# Patient Record
Sex: FEMALE | Race: WHITE | NOT HISPANIC OR LATINO
[De-identification: names, ages, dates, MRNs, and addresses within clinical notes are randomized per-mention and may not be internally consistent; named-entity substitution may affect disease eponyms.]

---

## 2017-03-04 ENCOUNTER — TRANSCRIPTION ENCOUNTER (OUTPATIENT)
Age: 57
End: 2017-03-04

## 2017-11-24 ENCOUNTER — TRANSCRIPTION ENCOUNTER (OUTPATIENT)
Age: 57
End: 2017-11-24

## 2018-04-18 ENCOUNTER — APPOINTMENT (OUTPATIENT)
Dept: INTERNAL MEDICINE | Facility: CLINIC | Age: 58
End: 2018-04-18

## 2018-06-14 ENCOUNTER — APPOINTMENT (OUTPATIENT)
Dept: PULMONOLOGY | Facility: CLINIC | Age: 58
End: 2018-06-14
Payer: MEDICAID

## 2018-06-14 ENCOUNTER — APPOINTMENT (OUTPATIENT)
Dept: PULMONOLOGY | Facility: CLINIC | Age: 58
End: 2018-06-14
Payer: COMMERCIAL

## 2018-06-14 VITALS
HEART RATE: 96 BPM | RESPIRATION RATE: 16 BRPM | OXYGEN SATURATION: 98 % | DIASTOLIC BLOOD PRESSURE: 70 MMHG | WEIGHT: 180 LBS | BODY MASS INDEX: 33.99 KG/M2 | HEIGHT: 61 IN | SYSTOLIC BLOOD PRESSURE: 122 MMHG

## 2018-06-14 DIAGNOSIS — Z87.09 PERSONAL HISTORY OF OTHER DISEASES OF THE RESPIRATORY SYSTEM: ICD-10-CM

## 2018-06-14 DIAGNOSIS — Z86.39 PERSONAL HISTORY OF OTHER ENDOCRINE, NUTRITIONAL AND METABOLIC DISEASE: ICD-10-CM

## 2018-06-14 DIAGNOSIS — J45.909 UNSPECIFIED ASTHMA, UNCOMPLICATED: ICD-10-CM

## 2018-06-14 DIAGNOSIS — Z86.79 PERSONAL HISTORY OF OTHER DISEASES OF THE CIRCULATORY SYSTEM: ICD-10-CM

## 2018-06-14 PROCEDURE — 96372 THER/PROPH/DIAG INJ SC/IM: CPT

## 2018-06-14 PROCEDURE — 99204 OFFICE O/P NEW MOD 45 MIN: CPT | Mod: 25

## 2018-06-14 PROCEDURE — ZZZZZ: CPT

## 2018-06-14 PROCEDURE — 94010 BREATHING CAPACITY TEST: CPT

## 2018-06-14 PROCEDURE — 94640 AIRWAY INHALATION TREATMENT: CPT | Mod: 58

## 2018-06-14 RX ORDER — SIMVASTATIN 40 MG/1
40 TABLET, FILM COATED ORAL
Qty: 30 | Refills: 0 | Status: ACTIVE | COMMUNITY
Start: 2018-01-01

## 2018-06-14 RX ORDER — METHYLPRED ACET/NACL,ISO-OS/PF 40 MG/ML
40 VIAL (ML) INJECTION
Qty: 100 | Refills: 0 | Status: COMPLETED | OUTPATIENT
Start: 2018-06-14

## 2018-06-14 RX ORDER — SYRINGE AND NEEDLE,INSULIN,1ML 31 GX5/16"
31G X 5/16" SYRINGE, EMPTY DISPOSABLE MISCELLANEOUS
Qty: 100 | Refills: 0 | Status: ACTIVE | COMMUNITY
Start: 2018-01-22

## 2018-06-14 RX ORDER — LOSARTAN POTASSIUM 100 MG/1
100 TABLET, FILM COATED ORAL
Qty: 30 | Refills: 0 | Status: ACTIVE | COMMUNITY
Start: 2018-02-22

## 2018-06-14 RX ORDER — BLOOD SUGAR DIAGNOSTIC
STRIP MISCELLANEOUS
Qty: 100 | Refills: 0 | Status: ACTIVE | COMMUNITY
Start: 2017-08-25

## 2018-06-14 RX ADMIN — METHYLPREDNISOLONE ACETATE 1 MG/ML: 40 INJECTION, SUSPENSION INTRA-ARTICULAR; INTRALESIONAL; INTRAMUSCULAR; SOFT TISSUE at 00:00

## 2018-06-22 ENCOUNTER — MEDICATION RENEWAL (OUTPATIENT)
Age: 58
End: 2018-06-22

## 2018-06-22 ENCOUNTER — APPOINTMENT (OUTPATIENT)
Dept: PULMONOLOGY | Facility: CLINIC | Age: 58
End: 2018-06-22
Payer: COMMERCIAL

## 2018-06-22 VITALS
HEART RATE: 88 BPM | BODY MASS INDEX: 35.3 KG/M2 | HEIGHT: 61 IN | SYSTOLIC BLOOD PRESSURE: 122 MMHG | OXYGEN SATURATION: 97 % | RESPIRATION RATE: 17 BRPM | WEIGHT: 187 LBS | DIASTOLIC BLOOD PRESSURE: 86 MMHG

## 2018-06-22 DIAGNOSIS — Z87.891 PERSONAL HISTORY OF NICOTINE DEPENDENCE: ICD-10-CM

## 2018-06-22 PROCEDURE — 99214 OFFICE O/P EST MOD 30 MIN: CPT

## 2018-06-23 PROBLEM — Z87.891 FORMER SMOKER: Status: ACTIVE | Noted: 2018-06-14

## 2018-06-27 ENCOUNTER — EMERGENCY (EMERGENCY)
Facility: HOSPITAL | Age: 58
LOS: 1 days | Discharge: HOME | End: 2018-06-27
Attending: EMERGENCY MEDICINE | Admitting: EMERGENCY MEDICINE
Payer: SELF-PAY

## 2018-06-27 VITALS
HEIGHT: 61 IN | HEART RATE: 66 BPM | WEIGHT: 182.98 LBS | OXYGEN SATURATION: 99 % | RESPIRATION RATE: 17 BRPM | TEMPERATURE: 98 F | SYSTOLIC BLOOD PRESSURE: 151 MMHG | DIASTOLIC BLOOD PRESSURE: 83 MMHG

## 2018-06-27 DIAGNOSIS — S19.9XXA UNSPECIFIED INJURY OF NECK, INITIAL ENCOUNTER: ICD-10-CM

## 2018-06-27 PROCEDURE — 72040 X-RAY EXAM NECK SPINE 2-3 VW: CPT | Mod: 26

## 2018-06-27 PROCEDURE — 72040 X-RAY EXAM NECK SPINE 2-3 VW: CPT

## 2018-06-27 PROCEDURE — 99283 EMERGENCY DEPT VISIT LOW MDM: CPT

## 2018-06-27 PROCEDURE — 99283 EMERGENCY DEPT VISIT LOW MDM: CPT | Mod: 25

## 2018-06-27 PROCEDURE — 99053 MED SERV 10PM-8AM 24 HR FAC: CPT

## 2018-06-27 RX ORDER — IBUPROFEN 200 MG
1 TABLET ORAL
Qty: 15 | Refills: 0
Start: 2018-06-27 | End: 2018-07-01

## 2018-06-27 RX ORDER — IBUPROFEN 200 MG
400 TABLET ORAL ONCE
Qty: 0 | Refills: 0 | Status: COMPLETED | OUTPATIENT
Start: 2018-06-27 | End: 2018-06-27

## 2018-06-27 RX ADMIN — Medication 400 MILLIGRAM(S): at 23:38

## 2018-06-27 RX ADMIN — Medication 400 MILLIGRAM(S): at 23:28
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
## 2018-06-27 NOTE — ED PROVIDER NOTE - DISCUSSED CLINICAL AND RADIOLOGICAL FINDINGS WITH, MDM
----- Message from Lloyd Bills MD sent at 4/6/2018 10:43 AM CDT -----  Urine protein is normal  
Phone call patient advised of results as noted below  
patient

## 2018-08-10 ENCOUNTER — APPOINTMENT (OUTPATIENT)
Dept: PULMONOLOGY | Facility: CLINIC | Age: 58
End: 2018-08-10
Payer: COMMERCIAL

## 2018-08-10 VITALS
HEART RATE: 88 BPM | DIASTOLIC BLOOD PRESSURE: 84 MMHG | OXYGEN SATURATION: 96 % | HEIGHT: 61 IN | TEMPERATURE: 98.4 F | WEIGHT: 187.4 LBS | RESPIRATION RATE: 16 BRPM | SYSTOLIC BLOOD PRESSURE: 137 MMHG | BODY MASS INDEX: 35.38 KG/M2

## 2018-08-10 PROBLEM — I10 ESSENTIAL (PRIMARY) HYPERTENSION: Chronic | Status: ACTIVE | Noted: 2018-06-27

## 2018-08-10 PROBLEM — E11.9 TYPE 2 DIABETES MELLITUS WITHOUT COMPLICATIONS: Chronic | Status: ACTIVE | Noted: 2018-06-27

## 2018-08-10 PROBLEM — F32.9 MAJOR DEPRESSIVE DISORDER, SINGLE EPISODE, UNSPECIFIED: Chronic | Status: ACTIVE | Noted: 2018-06-27

## 2018-08-10 PROBLEM — J45.909 UNSPECIFIED ASTHMA, UNCOMPLICATED: Chronic | Status: ACTIVE | Noted: 2018-06-27

## 2018-08-10 PROBLEM — E78.00 PURE HYPERCHOLESTEROLEMIA, UNSPECIFIED: Chronic | Status: ACTIVE | Noted: 2018-06-27

## 2018-08-10 PROCEDURE — 99214 OFFICE O/P EST MOD 30 MIN: CPT | Mod: 25

## 2018-08-10 PROCEDURE — 94010 BREATHING CAPACITY TEST: CPT

## 2018-08-10 PROCEDURE — 94727 GAS DIL/WSHOT DETER LNG VOL: CPT

## 2018-08-10 PROCEDURE — 94729 DIFFUSING CAPACITY: CPT

## 2018-08-10 PROCEDURE — 99214 OFFICE O/P EST MOD 30 MIN: CPT

## 2018-09-14 ENCOUNTER — APPOINTMENT (OUTPATIENT)
Dept: PULMONOLOGY | Facility: CLINIC | Age: 58
End: 2018-09-14
Payer: COMMERCIAL

## 2018-09-14 VITALS
BODY MASS INDEX: 34.23 KG/M2 | DIASTOLIC BLOOD PRESSURE: 80 MMHG | OXYGEN SATURATION: 96 % | RESPIRATION RATE: 16 BRPM | SYSTOLIC BLOOD PRESSURE: 140 MMHG | WEIGHT: 186 LBS | HEIGHT: 62 IN | HEART RATE: 98 BPM

## 2018-09-14 DIAGNOSIS — Z00.00 ENCOUNTER FOR GENERAL ADULT MEDICAL EXAMINATION W/OUT ABNORMAL FINDINGS: ICD-10-CM

## 2018-09-14 PROCEDURE — 94060 EVALUATION OF WHEEZING: CPT

## 2018-09-14 PROCEDURE — 99213 OFFICE O/P EST LOW 20 MIN: CPT | Mod: 25

## 2018-10-04 ENCOUNTER — APPOINTMENT (OUTPATIENT)
Dept: PULMONOLOGY | Facility: CLINIC | Age: 58
End: 2018-10-04
Payer: COMMERCIAL

## 2018-10-04 VITALS
BODY MASS INDEX: 34.23 KG/M2 | WEIGHT: 186 LBS | HEIGHT: 62 IN | HEART RATE: 103 BPM | OXYGEN SATURATION: 96 % | DIASTOLIC BLOOD PRESSURE: 82 MMHG | SYSTOLIC BLOOD PRESSURE: 126 MMHG

## 2018-10-04 DIAGNOSIS — R06.83 SNORING: ICD-10-CM

## 2018-10-04 DIAGNOSIS — Z23 ENCOUNTER FOR IMMUNIZATION: ICD-10-CM

## 2018-10-04 PROCEDURE — 99214 OFFICE O/P EST MOD 30 MIN: CPT | Mod: 25

## 2018-10-04 PROCEDURE — G0008: CPT

## 2018-10-04 PROCEDURE — 90686 IIV4 VACC NO PRSV 0.5 ML IM: CPT

## 2018-11-09 ENCOUNTER — APPOINTMENT (OUTPATIENT)
Dept: SLEEP CENTER | Facility: CLINIC | Age: 58
End: 2018-11-09

## 2018-12-13 ENCOUNTER — APPOINTMENT (OUTPATIENT)
Dept: SLEEP CENTER | Facility: CLINIC | Age: 58
End: 2018-12-13
Payer: COMMERCIAL

## 2018-12-13 ENCOUNTER — OUTPATIENT (OUTPATIENT)
Dept: OUTPATIENT SERVICES | Facility: HOSPITAL | Age: 58
LOS: 1 days | End: 2018-12-13
Payer: COMMERCIAL

## 2018-12-13 PROCEDURE — 95810 POLYSOM 6/> YRS 4/> PARAM: CPT | Mod: 26

## 2018-12-13 PROCEDURE — 95810 POLYSOM 6/> YRS 4/> PARAM: CPT

## 2018-12-14 DIAGNOSIS — G47.33 OBSTRUCTIVE SLEEP APNEA (ADULT) (PEDIATRIC): ICD-10-CM

## 2018-12-31 ENCOUNTER — APPOINTMENT (OUTPATIENT)
Dept: PULMONOLOGY | Facility: CLINIC | Age: 58
End: 2018-12-31
Payer: COMMERCIAL

## 2018-12-31 VITALS
BODY MASS INDEX: 33.68 KG/M2 | OXYGEN SATURATION: 96 % | WEIGHT: 183 LBS | SYSTOLIC BLOOD PRESSURE: 124 MMHG | RESPIRATION RATE: 15 BRPM | DIASTOLIC BLOOD PRESSURE: 80 MMHG | HEIGHT: 62 IN | HEART RATE: 90 BPM

## 2018-12-31 PROCEDURE — 96372 THER/PROPH/DIAG INJ SC/IM: CPT

## 2018-12-31 PROCEDURE — 99215 OFFICE O/P EST HI 40 MIN: CPT | Mod: 25

## 2018-12-31 PROCEDURE — 94640 AIRWAY INHALATION TREATMENT: CPT

## 2018-12-31 RX ORDER — METHYLPRED ACET/NACL,ISO-OS/PF 40 MG/ML
40 VIAL (ML) INJECTION
Qty: 1 | Refills: 0 | Status: COMPLETED | OUTPATIENT
Start: 2018-12-31

## 2018-12-31 RX ADMIN — IPRATROPIUM BROMIDE AND ALBUTEROL SULFATE 0 MG/3ML: 2.5; .5 SOLUTION RESPIRATORY (INHALATION) at 00:00

## 2018-12-31 RX ADMIN — METHYLPREDNISOLONE ACETATE 0 MG/ML: 40 INJECTION, SUSPENSION INTRA-ARTICULAR; INTRALESIONAL; INTRAMUSCULAR; SOFT TISSUE at 00:00

## 2019-01-04 ENCOUNTER — APPOINTMENT (OUTPATIENT)
Dept: PULMONOLOGY | Facility: CLINIC | Age: 59
End: 2019-01-04

## 2019-02-02 ENCOUNTER — TRANSCRIPTION ENCOUNTER (OUTPATIENT)
Age: 59
End: 2019-02-02

## 2019-02-06 ENCOUNTER — APPOINTMENT (OUTPATIENT)
Dept: PULMONOLOGY | Facility: CLINIC | Age: 59
End: 2019-02-06
Payer: MEDICAID

## 2019-02-06 VITALS
BODY MASS INDEX: 33.68 KG/M2 | OXYGEN SATURATION: 98 % | DIASTOLIC BLOOD PRESSURE: 78 MMHG | SYSTOLIC BLOOD PRESSURE: 121 MMHG | HEIGHT: 62 IN | RESPIRATION RATE: 17 BRPM | WEIGHT: 183 LBS | HEART RATE: 93 BPM

## 2019-02-06 PROCEDURE — 99214 OFFICE O/P EST MOD 30 MIN: CPT

## 2019-02-06 RX ORDER — AMOXICILLIN AND CLAVULANATE POTASSIUM 875; 125 MG/1; MG/1
875-125 TABLET, COATED ORAL
Qty: 14 | Refills: 0 | Status: COMPLETED | COMMUNITY
Start: 2018-12-31 | End: 2019-02-06

## 2019-02-06 RX ORDER — DOXYCYCLINE 100 MG/1
100 CAPSULE ORAL
Qty: 14 | Refills: 0 | Status: COMPLETED | COMMUNITY
Start: 2018-12-31 | End: 2019-02-06

## 2019-02-06 RX ORDER — PREDNISONE 20 MG/1
20 TABLET ORAL DAILY
Qty: 14 | Refills: 1 | Status: COMPLETED | COMMUNITY
Start: 2018-12-31 | End: 2019-02-06

## 2019-02-06 RX ORDER — ALBUTEROL SULFATE 90 UG/1
108 (90 BASE) AEROSOL, METERED RESPIRATORY (INHALATION)
Qty: 1 | Refills: 3 | Status: COMPLETED | COMMUNITY
Start: 2018-09-14 | End: 2019-02-06

## 2019-02-06 RX ORDER — PREDNISONE 10 MG/1
10 TABLET ORAL
Qty: 21 | Refills: 1 | Status: COMPLETED | COMMUNITY
Start: 2018-09-14 | End: 2019-02-06

## 2019-02-06 RX ORDER — DOXYCYCLINE 100 MG/1
100 TABLET, FILM COATED ORAL TWICE DAILY
Qty: 14 | Refills: 0 | Status: COMPLETED | COMMUNITY
Start: 2018-06-14 | End: 2019-02-06

## 2019-02-06 RX ORDER — PREDNISONE 20 MG/1
20 TABLET ORAL DAILY
Qty: 14 | Refills: 1 | Status: COMPLETED | COMMUNITY
Start: 2018-08-10 | End: 2019-02-06

## 2019-02-06 RX ORDER — BUDESONIDE AND FORMOTEROL FUMARATE DIHYDRATE 80; 4.5 UG/1; UG/1
80-4.5 AEROSOL RESPIRATORY (INHALATION)
Qty: 10.2 | Refills: 1 | Status: DISCONTINUED | COMMUNITY
Start: 2018-06-14 | End: 2019-02-06

## 2019-02-06 RX ORDER — PREDNISONE 20 MG/1
20 TABLET ORAL DAILY
Qty: 14 | Refills: 1 | Status: COMPLETED | COMMUNITY
Start: 2018-06-14 | End: 2019-02-06

## 2019-02-06 NOTE — ASSESSMENT
[FreeTextEntry1] : 58 year old female presents with Hx asthma, recently diagnosed with HUDSON presents for follow up\par \par CPAP titration ordered\par Continue Advair 250/50 1 puff twice daily\par Ventolin Rescue, 2 puffs every 4 hours as needed\par \par Follow up 4 months

## 2019-02-06 NOTE — HISTORY OF PRESENT ILLNESS
[FreeTextEntry1] : 58 year old female  Hx asthma, recently diagnosed HUDSON presents for follow up.  Patient recently returned from [place].  She is feeling well.  Patient had Sleep Study Done and diagnosed with mild HUDSON.  Patient is here for follow up.

## 2019-02-06 NOTE — PHYSICAL EXAM
[General Appearance - Well Developed] : well developed [General Appearance - Well Nourished] : well nourished [General Appearance - In No Acute Distress] : no acute distress [Normal Conjunctiva] : the conjunctiva exhibited no abnormalities [Erythema] : erythema of the pharynx [III] : III [] : the neck was supple [Jugular Venous Distention Increased] : there was no jugular-venous distention [Heart Rate And Rhythm] : heart rate and rhythm were normal [Heart Sounds] : normal S1 and S2 [Respiration, Rhythm And Depth] : normal respiratory rhythm and effort [Auscultation Breath Sounds / Voice Sounds] : lungs were clear to auscultation bilaterally [Abdomen Soft] : soft [Abdomen Tenderness] : non-tender [Abnormal Walk] : normal gait [Nail Clubbing] : no clubbing of the fingernails [Cyanosis, Localized] : no localized cyanosis [Non-Pitting] : non-pitting [Skin Color & Pigmentation] : normal skin color and pigmentation [Cranial Nerves] : cranial nerves 2-12 were intact [No Focal Deficits] : no focal deficits [Oriented To Time, Place, And Person] : oriented to person, place, and time

## 2019-02-06 NOTE — REVIEW OF SYSTEMS
[Hypertension] : ~T hypertension [Diabetes] : diabetes mellitus [Snoring] : snoring [Nonrestorative Sleep] : nonrestorative sleep [Negative] : Psychiatric

## 2019-03-12 ENCOUNTER — OUTPATIENT (OUTPATIENT)
Dept: OUTPATIENT SERVICES | Facility: HOSPITAL | Age: 59
LOS: 1 days | End: 2019-03-12
Payer: MEDICAID

## 2019-03-12 ENCOUNTER — APPOINTMENT (OUTPATIENT)
Dept: SLEEP CENTER | Facility: CLINIC | Age: 59
End: 2019-03-12
Payer: MEDICAID

## 2019-03-12 PROCEDURE — 95811 POLYSOM 6/>YRS CPAP 4/> PARM: CPT | Mod: 26

## 2019-03-12 PROCEDURE — 95811 POLYSOM 6/>YRS CPAP 4/> PARM: CPT

## 2019-03-13 DIAGNOSIS — G47.33 OBSTRUCTIVE SLEEP APNEA (ADULT) (PEDIATRIC): ICD-10-CM

## 2019-03-29 ENCOUNTER — APPOINTMENT (OUTPATIENT)
Dept: PULMONOLOGY | Facility: CLINIC | Age: 59
End: 2019-03-29
Payer: MEDICAID

## 2019-03-29 VITALS
SYSTOLIC BLOOD PRESSURE: 110 MMHG | RESPIRATION RATE: 17 BRPM | HEART RATE: 87 BPM | HEIGHT: 60 IN | OXYGEN SATURATION: 97 % | DIASTOLIC BLOOD PRESSURE: 80 MMHG | WEIGHT: 180 LBS | BODY MASS INDEX: 35.34 KG/M2

## 2019-03-29 DIAGNOSIS — Z87.09 PERSONAL HISTORY OF OTHER DISEASES OF THE RESPIRATORY SYSTEM: ICD-10-CM

## 2019-03-29 PROCEDURE — 99214 OFFICE O/P EST MOD 30 MIN: CPT

## 2019-03-29 RX ORDER — IPRATROPIUM BROMIDE AND ALBUTEROL SULFATE 2.5; .5 MG/3ML; MG/3ML
0.5-2.5 (3) SOLUTION RESPIRATORY (INHALATION)
Qty: 1 | Refills: 1 | Status: DISCONTINUED | COMMUNITY
Start: 2018-06-14 | End: 2019-03-29

## 2019-03-29 RX ORDER — AZITHROMYCIN 250 MG/1
250 TABLET, FILM COATED ORAL
Qty: 1 | Refills: 0 | Status: DISCONTINUED | COMMUNITY
Start: 2018-09-14 | End: 2019-03-29

## 2019-03-29 RX ORDER — MONTELUKAST 10 MG/1
10 TABLET, FILM COATED ORAL
Qty: 90 | Refills: 0 | Status: DISCONTINUED | COMMUNITY
Start: 2018-01-22 | End: 2019-03-29

## 2019-03-29 NOTE — ASSESSMENT
[FreeTextEntry1] : 58 year old female presents with Hx asthma, recently diagnosed with HUDSON presents for follow up\par \par Continue Advair 250/50 1 puff twice daily\par Ventolin Rescue, 2 puffs every 4 hours as needed\par Start Doxycycline 100 mg twice daily\par Start prednisone 20 mg 2 tabs daily\par Initiate Dymista as directed\par \par Follow up  2 weeks

## 2019-03-29 NOTE — HISTORY OF PRESENT ILLNESS
[FreeTextEntry1] : 58 year old female  Hx asthma, recently diagnosed HUDSON presents for follow up. Patient has been feeling poorly for several days.  She reports sinus congestion and headache along with increased shortness of breath and chest tightness.  She is here for these symptoms.

## 2019-04-23 ENCOUNTER — APPOINTMENT (OUTPATIENT)
Dept: PULMONOLOGY | Facility: CLINIC | Age: 59
End: 2019-04-23
Payer: MEDICAID

## 2019-04-23 VITALS
RESPIRATION RATE: 16 BRPM | HEART RATE: 86 BPM | WEIGHT: 183 LBS | HEIGHT: 60 IN | DIASTOLIC BLOOD PRESSURE: 80 MMHG | BODY MASS INDEX: 35.93 KG/M2 | SYSTOLIC BLOOD PRESSURE: 130 MMHG | OXYGEN SATURATION: 98 %

## 2019-04-23 PROCEDURE — 99215 OFFICE O/P EST HI 40 MIN: CPT | Mod: 25

## 2019-04-23 PROCEDURE — 96372 THER/PROPH/DIAG INJ SC/IM: CPT

## 2019-04-23 PROCEDURE — 94640 AIRWAY INHALATION TREATMENT: CPT

## 2019-04-23 RX ORDER — METHYLPRED ACET/NACL,ISO-OS/PF 40 MG/ML
40 VIAL (ML) INJECTION
Qty: 1 | Refills: 0 | Status: COMPLETED | OUTPATIENT
Start: 2019-04-23

## 2019-04-23 RX ADMIN — IPRATROPIUM BROMIDE AND ALBUTEROL SULFATE 0 MG/3ML: 2.5; .5 SOLUTION RESPIRATORY (INHALATION) at 00:00

## 2019-04-23 RX ADMIN — METHYLPREDNISOLONE ACETATE 1 MG/ML: 40 INJECTION, SUSPENSION INTRA-ARTICULAR; INTRALESIONAL; INTRAMUSCULAR; SOFT TISSUE at 00:00

## 2019-04-23 NOTE — ASSESSMENT
[FreeTextEntry1] : 58 year old female presents with Hx asthma, recently diagnosed with HUDSON presents for follow up, now with acute bronchitis\par \par Continue Advair 250/50 1 puff twice daily\par Ventolin Rescue, 2 puffs every 4 hours as needed\par Start Levaquin 500 mg daily x 5 days\par Start prednisone 20 mg 2 tabs daily\par Dymista as directed \par Consider Hypersensitivity workup after off prednisone\par \par

## 2019-04-23 NOTE — PHYSICAL EXAM
[General Appearance - Well Nourished] : well nourished [General Appearance - Well Developed] : well developed [General Appearance - In No Acute Distress] : no acute distress [III] : III [Erythema] : erythema of the pharynx [Normal Conjunctiva] : the conjunctiva exhibited no abnormalities [Jugular Venous Distention Increased] : there was no jugular-venous distention [] : the neck was supple [Heart Sounds] : normal S1 and S2 [Heart Rate And Rhythm] : heart rate and rhythm were normal [Respiration, Rhythm And Depth] : normal respiratory rhythm and effort [Auscultation Breath Sounds / Voice Sounds] : lungs were clear to auscultation bilaterally [Abdomen Soft] : soft [Abdomen Tenderness] : non-tender [Abnormal Walk] : normal gait [Nail Clubbing] : no clubbing of the fingernails [Non-Pitting] : non-pitting [Cyanosis, Localized] : no localized cyanosis [Skin Color & Pigmentation] : normal skin color and pigmentation [Cranial Nerves] : cranial nerves 2-12 were intact [No Focal Deficits] : no focal deficits [Oriented To Time, Place, And Person] : oriented to person, place, and time

## 2019-04-23 NOTE — HISTORY OF PRESENT ILLNESS
[FreeTextEntry1] : 58 year old female  Hx asthma, recently diagnosed HUDSON presents for follow up. Patient has been feeling poorly for past two days.  She began experiencing productive cough, green sputum, increased shortness of breath.  She was unable to sleep last evening.  She is here for follow up.

## 2019-04-23 NOTE — PROCEDURE
[FreeTextEntry1] : Solumedrol 40 mg x 1 IM given in office\par \par Duo Neb treatment given in office

## 2019-04-23 NOTE — REVIEW OF SYSTEMS
[Hypertension] : ~T hypertension [Snoring] : snoring [Diabetes] : diabetes mellitus [Nonrestorative Sleep] : nonrestorative sleep [Negative] : Hematologic

## 2019-05-08 ENCOUNTER — APPOINTMENT (OUTPATIENT)
Dept: PULMONOLOGY | Facility: CLINIC | Age: 59
End: 2019-05-08
Payer: MEDICAID

## 2019-05-08 ENCOUNTER — LABORATORY RESULT (OUTPATIENT)
Age: 59
End: 2019-05-08

## 2019-05-08 VITALS
HEIGHT: 60 IN | OXYGEN SATURATION: 98 % | WEIGHT: 184 LBS | BODY MASS INDEX: 36.12 KG/M2 | DIASTOLIC BLOOD PRESSURE: 82 MMHG | SYSTOLIC BLOOD PRESSURE: 122 MMHG | HEART RATE: 102 BPM | RESPIRATION RATE: 17 BRPM

## 2019-05-08 LAB
ALBUMIN SERPL ELPH-MCNC: 4.2 G/DL
ALP BLD-CCNC: 83 U/L
ALT SERPL-CCNC: 24 U/L
ANION GAP SERPL CALC-SCNC: 13 MMOL/L
AST SERPL-CCNC: 18 U/L
BASOPHILS # BLD AUTO: 0.05 K/UL
BASOPHILS NFR BLD AUTO: 0.6 %
BILIRUB SERPL-MCNC: 0.2 MG/DL
BUN SERPL-MCNC: 13 MG/DL
CALCIUM SERPL-MCNC: 9.2 MG/DL
CHLORIDE SERPL-SCNC: 97 MMOL/L
CO2 SERPL-SCNC: 26 MMOL/L
CREAT SERPL-MCNC: 0.64 MG/DL
EOSINOPHIL # BLD AUTO: 0.18 K/UL
EOSINOPHIL NFR BLD AUTO: 2.3 %
GLUCOSE SERPL-MCNC: 268 MG/DL
HCT VFR BLD CALC: 38 %
HGB BLD-MCNC: 12.2 G/DL
IMM GRANULOCYTES NFR BLD AUTO: 0.4 %
LYMPHOCYTES # BLD AUTO: 2.01 K/UL
LYMPHOCYTES NFR BLD AUTO: 25.4 %
MAN DIFF?: NORMAL
MCHC RBC-ENTMCNC: 28.5 PG
MCHC RBC-ENTMCNC: 32.1 GM/DL
MCV RBC AUTO: 88.8 FL
MONOCYTES # BLD AUTO: 0.61 K/UL
MONOCYTES NFR BLD AUTO: 7.7 %
NEUTROPHILS # BLD AUTO: 5.03 K/UL
NEUTROPHILS NFR BLD AUTO: 63.6 %
PLATELET # BLD AUTO: 202 K/UL
POTASSIUM SERPL-SCNC: 4.3 MMOL/L
PROT SERPL-MCNC: 6.7 G/DL
RBC # BLD: 4.28 M/UL
RBC # FLD: 13 %
SODIUM SERPL-SCNC: 136 MMOL/L
WBC # FLD AUTO: 7.91 K/UL

## 2019-05-08 PROCEDURE — 99214 OFFICE O/P EST MOD 30 MIN: CPT

## 2019-05-08 NOTE — PHYSICAL EXAM
[General Appearance - Well Developed] : well developed [General Appearance - Well Nourished] : well nourished [Normal Conjunctiva] : the conjunctiva exhibited no abnormalities [General Appearance - In No Acute Distress] : no acute distress [Erythema] : erythema of the pharynx [] : the neck was supple [III] : III [Jugular Venous Distention Increased] : there was no jugular-venous distention [Heart Rate And Rhythm] : heart rate and rhythm were normal [Heart Sounds] : normal S1 and S2 [Respiration, Rhythm And Depth] : normal respiratory rhythm and effort [Auscultation Breath Sounds / Voice Sounds] : lungs were clear to auscultation bilaterally [Abdomen Soft] : soft [Abdomen Tenderness] : non-tender [Abnormal Walk] : normal gait [Nail Clubbing] : no clubbing of the fingernails [Cyanosis, Localized] : no localized cyanosis [Non-Pitting] : non-pitting [Skin Color & Pigmentation] : normal skin color and pigmentation [Cranial Nerves] : cranial nerves 2-12 were intact [No Focal Deficits] : no focal deficits [Oriented To Time, Place, And Person] : oriented to person, place, and time

## 2019-05-08 NOTE — ASSESSMENT
[FreeTextEntry1] : 58 year old female presents with Hx asthma, recently diagnosed with HUDSON presents for follow up, now with acute bronchitis\par \par Continue Advair 250/50 1 puff twice daily\par Ventolin Rescue, 2 puffs every 4 hours as needed\par Dymista as directed \par Hypersensitivity workup in progress \par \par Follow after blood work \par

## 2019-05-08 NOTE — HISTORY OF PRESENT ILLNESS
[FreeTextEntry1] : 58 year old female  Hx asthma, recently diagnosed HUDSON presents presents for follow up recent asthma exacerbation and acute bronchitis.  Patient reports she is feeling much better. She completed course of antibiotics as well as prednisone.  Charles is considering Xolair or Nucala/Fasenra treatment.  She is here for follow up.

## 2019-05-10 LAB
DEPRECATED KAPPA LC FREE/LAMBDA SER: 1.2 RATIO
IGA SER QL IEP: 199 MG/DL
IGG SER QL IEP: 806 MG/DL
IGM SER QL IEP: 56 MG/DL
KAPPA LC CSF-MCNC: 1.47 MG/DL
KAPPA LC SERPL-MCNC: 1.76 MG/DL

## 2019-05-11 LAB
A ALTERNATA IGE QN: <0.1 KUA/L
A FUMIGATUS IGE QN: <0.1 KUA/L
C ALBICANS IGE QN: 0.12 KUA/L
C HERBARUM IGE QN: <0.1 KUA/L
CAT DANDER IGE QN: <0.1 KUA/L
COMMON RAGWEED IGE QN: <0.1 KUA/L
D FARINAE IGE QN: <0.1 KUA/L
D PTERONYSS IGE QN: <0.1 KUA/L
DEPRECATED A ALTERNATA IGE RAST QL: 0
DEPRECATED A FUMIGATUS IGE RAST QL: 0
DEPRECATED C ALBICANS IGE RAST QL: NORMAL
DEPRECATED C HERBARUM IGE RAST QL: 0
DEPRECATED CAT DANDER IGE RAST QL: 0
DEPRECATED COMMON RAGWEED IGE RAST QL: 0
DEPRECATED D FARINAE IGE RAST QL: 0
DEPRECATED D PTERONYSS IGE RAST QL: 0
DEPRECATED DOG DANDER IGE RAST QL: 0
DEPRECATED M RACEMOSUS IGE RAST QL: 0
DEPRECATED ROACH IGE RAST QL: 0
DEPRECATED TIMOTHY IGE RAST QL: 0
DEPRECATED WHITE OAK IGE RAST QL: 0
DOG DANDER IGE QN: <0.1 KUA/L
M RACEMOSUS IGE QN: <0.1 KUA/L
ROACH IGE QN: <0.1 KUA/L
TIMOTHY IGE QN: <0.1 KUA/L
TOTAL IGE SMQN RAST: 455 KU/L
WHITE OAK IGE QN: <0.1 KUA/L

## 2019-05-13 LAB
ALTERN TENCAPG(M6): 2.2 MCG/ML
ASPER FUMCAPG(M3): 6.7 MCG/ML
AUREOBASCAPG(M12): 4.2 MCG/ML
MICROPOLYCAPG(M22): <2 MCG/ML
PENIC CHRYCAPG(M1): 3.9 MCG/ML
PHOMA BETAE IGG: 2.4 MCG/ML
THERMOCAPG(M23): <2 MCG/ML
TRICHODERMA VIRIDE IGG: 5.7 MCG/ML

## 2019-05-28 ENCOUNTER — LABORATORY RESULT (OUTPATIENT)
Age: 59
End: 2019-05-28

## 2019-05-28 ENCOUNTER — APPOINTMENT (OUTPATIENT)
Dept: PULMONOLOGY | Facility: CLINIC | Age: 59
End: 2019-05-28
Payer: MEDICAID

## 2019-05-28 VITALS
BODY MASS INDEX: 38.83 KG/M2 | DIASTOLIC BLOOD PRESSURE: 70 MMHG | SYSTOLIC BLOOD PRESSURE: 120 MMHG | HEIGHT: 58 IN | HEART RATE: 83 BPM | RESPIRATION RATE: 16 BRPM | WEIGHT: 185 LBS | OXYGEN SATURATION: 96 %

## 2019-05-28 PROCEDURE — 99215 OFFICE O/P EST HI 40 MIN: CPT

## 2019-05-28 NOTE — REVIEW OF SYSTEMS
[Diabetes] : diabetes mellitus [Hypertension] : ~T hypertension [Nonrestorative Sleep] : nonrestorative sleep [Snoring] : snoring [Negative] : Psychiatric

## 2019-05-28 NOTE — HISTORY OF PRESENT ILLNESS
[FreeTextEntry1] : 58 year old female  Hx asthma, recently diagnosed HUDSON presents presents for follow up recent asthma exacerbation and acute bronchitis.  Patient reports she is feeling much better. She completed course of antibiotics as well as prednisone.  Patient is considering Xolair or Nucala/Fasenra treatment.  Patient had blood work done revealing elevated IgE however no aeroallergen allergen as yet. Patient is here for blood work results and to discuss plan or care.

## 2019-05-28 NOTE — PHYSICAL EXAM
[General Appearance - Well Developed] : well developed [General Appearance - Well Nourished] : well nourished [General Appearance - In No Acute Distress] : no acute distress [Normal Conjunctiva] : the conjunctiva exhibited no abnormalities [Erythema] : erythema of the pharynx [III] : III [Heart Rate And Rhythm] : heart rate and rhythm were normal [] : the neck was supple [Jugular Venous Distention Increased] : there was no jugular-venous distention [Respiration, Rhythm And Depth] : normal respiratory rhythm and effort [Heart Sounds] : normal S1 and S2 [Auscultation Breath Sounds / Voice Sounds] : lungs were clear to auscultation bilaterally [Abdomen Soft] : soft [Abdomen Tenderness] : non-tender [Abnormal Walk] : normal gait [Nail Clubbing] : no clubbing of the fingernails [Cyanosis, Localized] : no localized cyanosis [Non-Pitting] : non-pitting [Cranial Nerves] : cranial nerves 2-12 were intact [Skin Color & Pigmentation] : normal skin color and pigmentation [Oriented To Time, Place, And Person] : oriented to person, place, and time [No Focal Deficits] : no focal deficits

## 2019-05-28 NOTE — ASSESSMENT
[FreeTextEntry1] : 58 year old female presents with Hx asthma, recently diagnosed with HUDSON presents for follow up\par \par Continue Advair 250/50 1 puff twice daily\par Ventolin Rescue, 2 puffs every 4 hours as needed\par Dymista as directed \par Will send further aeroallergen testing\par CPAP titration study ordered \par \par Follow after blood work \par

## 2019-06-02 LAB
BERMUDA GRASS IGE QN: <0.1 KUA/L
BOXELDER IGE QN: <0.1 KUA/L
BOXELDER IGE QN: <0.1 KUA/L
CMN PIGWEED IGE QN: <0.1 KUA/L
COTTONWOOD IGE QN: <0.1 KUA/L
COTTONWOOD IGE QN: <0.1 KUA/L
DEPRECATED BERMUDA GRASS IGE RAST QL: 0
DEPRECATED BOXELDER IGE RAST QL: 0
DEPRECATED BOXELDER IGE RAST QL: 0
DEPRECATED COMMON PIGWEED IGE RAST QL: 0
DEPRECATED COTTONWOOD IGE RAST QL: 0
DEPRECATED COTTONWOOD IGE RAST QL: 0
DEPRECATED LONDON PLANE IGE RAST QL: 0
DEPRECATED MUGWORT IGE RAST QL: 0
DEPRECATED RED CEDAR IGE RAST QL: 0
DEPRECATED SHEEP SORREL IGE RAST QL: 0
DEPRECATED SILVER BIRCH IGE RAST QL: 0
DEPRECATED SILVER BIRCH IGE RAST QL: 0
DEPRECATED WALNUT IGE RAST QL: 0
DEPRECATED WHITE ASH IGE RAST QL: 0
DEPRECATED WHITE ASH IGE RAST QL: 0
LONDON PLANE IGE QN: <0.1 KUA/L
MUGWORT IGE QN: <0.1 KUA/L
MULBERRY (T70) CLASS: 0
MULBERRY (T70) CONC: <0.1 KUA/L
RED CEDAR IGE QN: <0.1 KUA/L
SHEEP SORREL IGE QN: <0.1 KUA/L
SILVER BIRCH IGE QN: <0.1 KUA/L
SILVER BIRCH IGE QN: <0.1 KUA/L
WALNUT IGE QN: <0.1 KUA/L
WHITE ASH IGE QN: <0.1 KUA/L
WHITE ASH IGE QN: <0.1 KUA/L
WHITE ELM IGE QN: 0
WHITE ELM IGE QN: 0
WHITE ELM IGE QN: <0.1 KUA/L
WHITE ELM IGE QN: <0.1 KUA/L

## 2019-07-12 ENCOUNTER — APPOINTMENT (OUTPATIENT)
Dept: SLEEP CENTER | Facility: CLINIC | Age: 59
End: 2019-07-12

## 2019-10-10 ENCOUNTER — OUTPATIENT (OUTPATIENT)
Dept: OUTPATIENT SERVICES | Facility: HOSPITAL | Age: 59
LOS: 1 days | End: 2019-10-10
Payer: MEDICAID

## 2019-10-10 ENCOUNTER — APPOINTMENT (OUTPATIENT)
Dept: SLEEP CENTER | Facility: CLINIC | Age: 59
End: 2019-10-10
Payer: MEDICAID

## 2019-10-10 PROCEDURE — 95811 POLYSOM 6/>YRS CPAP 4/> PARM: CPT

## 2019-10-10 PROCEDURE — 95811 POLYSOM 6/>YRS CPAP 4/> PARM: CPT | Mod: 26

## 2019-10-14 DIAGNOSIS — G47.33 OBSTRUCTIVE SLEEP APNEA (ADULT) (PEDIATRIC): ICD-10-CM

## 2019-11-05 ENCOUNTER — APPOINTMENT (OUTPATIENT)
Dept: PULMONOLOGY | Facility: CLINIC | Age: 59
End: 2019-11-05
Payer: MEDICAID

## 2019-11-05 VITALS
SYSTOLIC BLOOD PRESSURE: 130 MMHG | RESPIRATION RATE: 16 BRPM | HEART RATE: 90 BPM | DIASTOLIC BLOOD PRESSURE: 78 MMHG | HEIGHT: 58 IN | OXYGEN SATURATION: 97 % | BODY MASS INDEX: 36.94 KG/M2 | WEIGHT: 176 LBS

## 2019-11-05 PROCEDURE — 99215 OFFICE O/P EST HI 40 MIN: CPT

## 2019-11-05 NOTE — ASSESSMENT
[FreeTextEntry1] : 59 year old female presents with Hx severe persistent eosinophilic asthma, recently diagnosed with HUDSON presents for follow up, discussion of Nucala treatment\par \par Continue Advair 250/50 1 puff twice daily\par Ventolin Rescue, 2 puffs every 4 hours as needed\par Dymista as directed \par CPAP supplies ordered \par PFT next visit \par \par Follow up 3 months \par

## 2019-11-05 NOTE — HISTORY OF PRESENT ILLNESS
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  Patient had CPAP titration done and  is awaiting CPAP supplies.  Patient feeling well.  She underwent hypersensitivity and asthma profile testing.  Absolute eosinophil count 181.  She is a candidate for Nucala.  She is here for follow up

## 2019-11-07 RX ORDER — FLUTICASONE PROPIONATE 50 UG/1
50 SPRAY, METERED NASAL TWICE DAILY
Qty: 1 | Refills: 0 | Status: ACTIVE | COMMUNITY
Start: 2019-11-07 | End: 1900-01-01

## 2019-12-01 ENCOUNTER — OUTPATIENT (OUTPATIENT)
Dept: OUTPATIENT SERVICES | Facility: HOSPITAL | Age: 59
LOS: 1 days | End: 2019-12-01
Payer: MEDICAID

## 2019-12-01 PROCEDURE — G9001: CPT

## 2019-12-03 ENCOUNTER — APPOINTMENT (OUTPATIENT)
Dept: PULMONOLOGY | Facility: CLINIC | Age: 59
End: 2019-12-03
Payer: MEDICAID

## 2019-12-03 VITALS
HEIGHT: 58 IN | OXYGEN SATURATION: 96 % | RESPIRATION RATE: 16 BRPM | WEIGHT: 176 LBS | HEART RATE: 106 BPM | DIASTOLIC BLOOD PRESSURE: 80 MMHG | BODY MASS INDEX: 36.94 KG/M2 | SYSTOLIC BLOOD PRESSURE: 120 MMHG

## 2019-12-03 PROCEDURE — 99214 OFFICE O/P EST MOD 30 MIN: CPT | Mod: 25

## 2019-12-03 PROCEDURE — 96372 THER/PROPH/DIAG INJ SC/IM: CPT

## 2019-12-03 RX ORDER — METHYLPRED ACET/NACL,ISO-OS/PF 40 MG/ML
40 VIAL (ML) INJECTION
Qty: 1 | Refills: 0 | Status: COMPLETED | OUTPATIENT
Start: 2019-12-03

## 2019-12-03 RX ORDER — DOXYCYCLINE 100 MG/1
100 CAPSULE ORAL
Qty: 14 | Refills: 0 | Status: DISCONTINUED | COMMUNITY
Start: 2019-03-29 | End: 2019-12-03

## 2019-12-03 RX ADMIN — Medication 0 MG/ML: at 00:00

## 2019-12-03 NOTE — ASSESSMENT
[FreeTextEntry1] : 59 year old female presents with Hx severe persistent eosinophilic asthma, recently diagnosed with HUDSON presents for increased symptoms cough, shortness of breath, now with acute URI, asthma exacerbation\par \par Continue Advair 250/50 1 puff twice daily\par Ventolin Rescue, 2 puffs every 4 hours as needed\par Dymista as directed \par Cefuroxime 500 mg twice daily x 7 days\par Prednisone 20 mg 2 tabs daily x 10 days\par Pseudo Bromo cough syrup as directed \par Nebulized Albuterol/Ipratropium every 4-6 hours as needed \par \par Follow up 2 weeks \par

## 2019-12-03 NOTE — HISTORY OF PRESENT ILLNESS
[FreeTextEntry1] : 59 year old female severe eosinophilic asthma presents evaluation increased cough shortness of breath and wheeze.  Patient reports her granddaughter was recently ill.  She thinks she caught her virus.  Patient reports cough is unbearable and keeping her awake.  She is short of breath.  Patient is here for these symptoms.

## 2019-12-03 NOTE — PHYSICAL EXAM
[General Appearance - Well Developed] : well developed [General Appearance - In No Acute Distress] : no acute distress [General Appearance - Well Nourished] : well nourished [Normal Conjunctiva] : the conjunctiva exhibited no abnormalities [III] : III [Erythema] : erythema of the pharynx [] : the neck was supple [Heart Rate And Rhythm] : heart rate and rhythm were normal [Jugular Venous Distention Increased] : there was no jugular-venous distention [Respiration, Rhythm And Depth] : normal respiratory rhythm and effort [Auscultation Breath Sounds / Voice Sounds] : lungs were clear to auscultation bilaterally [Heart Sounds] : normal S1 and S2 [Abnormal Walk] : normal gait [Abdomen Tenderness] : non-tender [Abdomen Soft] : soft [Nail Clubbing] : no clubbing of the fingernails [Cyanosis, Localized] : no localized cyanosis [Non-Pitting] : non-pitting [No Focal Deficits] : no focal deficits [Skin Color & Pigmentation] : normal skin color and pigmentation [Cranial Nerves] : cranial nerves 2-12 were intact [Oriented To Time, Place, And Person] : oriented to person, place, and time

## 2019-12-08 ENCOUNTER — TRANSCRIPTION ENCOUNTER (OUTPATIENT)
Age: 59
End: 2019-12-08

## 2019-12-09 DIAGNOSIS — Z29.9 ENCOUNTER FOR PROPHYLACTIC MEASURES, UNSPECIFIED: ICD-10-CM

## 2019-12-09 RX ORDER — EPINEPHRINE 0.3 MG/.3ML
0.3 INJECTION INTRAMUSCULAR
Qty: 1 | Refills: 0 | Status: ACTIVE | COMMUNITY
Start: 2019-12-09 | End: 1900-01-01

## 2019-12-10 ENCOUNTER — INPATIENT (INPATIENT)
Facility: HOSPITAL | Age: 59
LOS: 6 days | Discharge: HOME | DRG: 197 | End: 2019-12-17
Attending: FAMILY MEDICINE | Admitting: FAMILY MEDICINE
Payer: MEDICAID

## 2019-12-10 VITALS
HEIGHT: 61 IN | RESPIRATION RATE: 18 BRPM | HEART RATE: 88 BPM | DIASTOLIC BLOOD PRESSURE: 87 MMHG | OXYGEN SATURATION: 95 % | TEMPERATURE: 98 F | WEIGHT: 173.94 LBS | SYSTOLIC BLOOD PRESSURE: 136 MMHG

## 2019-12-10 DIAGNOSIS — J10.1 INFLUENZA DUE TO OTHER IDENTIFIED INFLUENZA VIRUS WITH OTHER RESPIRATORY MANIFESTATIONS: ICD-10-CM

## 2019-12-10 DIAGNOSIS — E87.2 ACIDOSIS: ICD-10-CM

## 2019-12-10 DIAGNOSIS — I10 ESSENTIAL (PRIMARY) HYPERTENSION: ICD-10-CM

## 2019-12-10 DIAGNOSIS — J45.901 UNSPECIFIED ASTHMA WITH (ACUTE) EXACERBATION: ICD-10-CM

## 2019-12-10 DIAGNOSIS — E11.9 TYPE 2 DIABETES MELLITUS WITHOUT COMPLICATIONS: ICD-10-CM

## 2019-12-10 DIAGNOSIS — Z29.9 ENCOUNTER FOR PROPHYLACTIC MEASURES, UNSPECIFIED: ICD-10-CM

## 2019-12-10 DIAGNOSIS — F41.9 ANXIETY DISORDER, UNSPECIFIED: ICD-10-CM

## 2019-12-10 DIAGNOSIS — B97.4 RESPIRATORY SYNCYTIAL VIRUS AS THE CAUSE OF DISEASES CLASSIFIED ELSEWHERE: ICD-10-CM

## 2019-12-10 DIAGNOSIS — E78.00 PURE HYPERCHOLESTEROLEMIA, UNSPECIFIED: ICD-10-CM

## 2019-12-10 LAB
ALBUMIN SERPL ELPH-MCNC: 4.3 G/DL — SIGNIFICANT CHANGE UP (ref 3.3–5)
ALP SERPL-CCNC: 84 U/L — SIGNIFICANT CHANGE UP (ref 40–120)
ALT FLD-CCNC: 25 U/L — SIGNIFICANT CHANGE UP (ref 10–45)
ANION GAP SERPL CALC-SCNC: 12 MMOL/L — SIGNIFICANT CHANGE UP (ref 5–17)
ANION GAP SERPL CALC-SCNC: 19 MMOL/L — HIGH (ref 5–17)
AST SERPL-CCNC: 18 U/L — SIGNIFICANT CHANGE UP (ref 10–40)
B-OH-BUTYR SERPL-SCNC: 0.2 MMOL/L — SIGNIFICANT CHANGE UP
BASOPHILS # BLD AUTO: 0 K/UL — SIGNIFICANT CHANGE UP (ref 0–0.2)
BASOPHILS NFR BLD AUTO: 0 % — SIGNIFICANT CHANGE UP (ref 0–2)
BILIRUB SERPL-MCNC: 0.2 MG/DL — SIGNIFICANT CHANGE UP (ref 0.2–1.2)
BUN SERPL-MCNC: 12 MG/DL — SIGNIFICANT CHANGE UP (ref 7–23)
BUN SERPL-MCNC: 14 MG/DL — SIGNIFICANT CHANGE UP (ref 7–23)
CALCIUM SERPL-MCNC: 8.6 MG/DL — SIGNIFICANT CHANGE UP (ref 8.4–10.5)
CALCIUM SERPL-MCNC: 9.5 MG/DL — SIGNIFICANT CHANGE UP (ref 8.4–10.5)
CHLORIDE SERPL-SCNC: 105 MMOL/L — SIGNIFICANT CHANGE UP (ref 96–108)
CHLORIDE SERPL-SCNC: 97 MMOL/L — SIGNIFICANT CHANGE UP (ref 96–108)
CO2 SERPL-SCNC: 20 MMOL/L — LOW (ref 22–31)
CO2 SERPL-SCNC: 21 MMOL/L — LOW (ref 22–31)
CREAT SERPL-MCNC: 0.49 MG/DL — LOW (ref 0.5–1.3)
CREAT SERPL-MCNC: 0.64 MG/DL — SIGNIFICANT CHANGE UP (ref 0.5–1.3)
EOSINOPHIL # BLD AUTO: 0 K/UL — SIGNIFICANT CHANGE UP (ref 0–0.5)
EOSINOPHIL NFR BLD AUTO: 0 % — SIGNIFICANT CHANGE UP (ref 0–6)
FLU A RESULT: SIGNIFICANT CHANGE UP
FLU A RESULT: SIGNIFICANT CHANGE UP
FLUAV AG NPH QL: SIGNIFICANT CHANGE UP
FLUAV SUBTYP SPEC NAA+PROBE: ABNORMAL
FLUBV AG NPH QL: SIGNIFICANT CHANGE UP
GAS PNL BLDV: SIGNIFICANT CHANGE UP
GLUCOSE BLDC GLUCOMTR-MCNC: 150 MG/DL — HIGH (ref 70–99)
GLUCOSE BLDC GLUCOMTR-MCNC: 190 MG/DL — HIGH (ref 70–99)
GLUCOSE BLDC GLUCOMTR-MCNC: 253 MG/DL — HIGH (ref 70–99)
GLUCOSE SERPL-MCNC: 246 MG/DL — HIGH (ref 70–99)
GLUCOSE SERPL-MCNC: 271 MG/DL — HIGH (ref 70–99)
HCT VFR BLD CALC: 39.4 % — SIGNIFICANT CHANGE UP (ref 34.5–45)
HGB BLD-MCNC: 12.8 G/DL — SIGNIFICANT CHANGE UP (ref 11.5–15.5)
LACTATE SERPL-SCNC: 2.5 MMOL/L — HIGH (ref 0.7–2)
LYMPHOCYTES # BLD AUTO: 1.55 K/UL — SIGNIFICANT CHANGE UP (ref 1–3.3)
LYMPHOCYTES # BLD AUTO: 15.8 % — SIGNIFICANT CHANGE UP (ref 13–44)
MCHC RBC-ENTMCNC: 27.7 PG — SIGNIFICANT CHANGE UP (ref 27–34)
MCHC RBC-ENTMCNC: 32.5 GM/DL — SIGNIFICANT CHANGE UP (ref 32–36)
MCV RBC AUTO: 85.3 FL — SIGNIFICANT CHANGE UP (ref 80–100)
MONOCYTES # BLD AUTO: 0.17 K/UL — SIGNIFICANT CHANGE UP (ref 0–0.9)
MONOCYTES NFR BLD AUTO: 1.7 % — LOW (ref 2–14)
NEUTROPHILS # BLD AUTO: 8.09 K/UL — HIGH (ref 1.8–7.4)
NEUTROPHILS NFR BLD AUTO: 82.5 % — HIGH (ref 43–77)
PLATELET # BLD AUTO: 235 K/UL — SIGNIFICANT CHANGE UP (ref 150–400)
POTASSIUM SERPL-MCNC: 4 MMOL/L — SIGNIFICANT CHANGE UP (ref 3.5–5.3)
POTASSIUM SERPL-MCNC: 4.3 MMOL/L — SIGNIFICANT CHANGE UP (ref 3.5–5.3)
POTASSIUM SERPL-SCNC: 4 MMOL/L — SIGNIFICANT CHANGE UP (ref 3.5–5.3)
POTASSIUM SERPL-SCNC: 4.3 MMOL/L — SIGNIFICANT CHANGE UP (ref 3.5–5.3)
PROT SERPL-MCNC: 7.6 G/DL — SIGNIFICANT CHANGE UP (ref 6–8.3)
RAPID RVP RESULT: DETECTED
RBC # BLD: 4.62 M/UL — SIGNIFICANT CHANGE UP (ref 3.8–5.2)
RBC # FLD: 15.4 % — HIGH (ref 10.3–14.5)
RSV RESULT: SIGNIFICANT CHANGE UP
RSV RNA RESP QL NAA+PROBE: SIGNIFICANT CHANGE UP
RSV RNA SPEC QL NAA+PROBE: DETECTED
SODIUM SERPL-SCNC: 136 MMOL/L — SIGNIFICANT CHANGE UP (ref 135–145)
SODIUM SERPL-SCNC: 138 MMOL/L — SIGNIFICANT CHANGE UP (ref 135–145)
WBC # BLD: 9.81 K/UL — SIGNIFICANT CHANGE UP (ref 3.8–10.5)
WBC # FLD AUTO: 9.81 K/UL — SIGNIFICANT CHANGE UP (ref 3.8–10.5)

## 2019-12-10 PROCEDURE — 71046 X-RAY EXAM CHEST 2 VIEWS: CPT | Mod: 26

## 2019-12-10 PROCEDURE — 99223 1ST HOSP IP/OBS HIGH 75: CPT

## 2019-12-10 PROCEDURE — 99284 EMERGENCY DEPT VISIT MOD MDM: CPT

## 2019-12-10 RX ORDER — DEXTROSE 50 % IN WATER 50 %
25 SYRINGE (ML) INTRAVENOUS ONCE
Refills: 0 | Status: DISCONTINUED | OUTPATIENT
Start: 2019-12-10 | End: 2019-12-17

## 2019-12-10 RX ORDER — INSULIN LISPRO 100/ML
VIAL (ML) SUBCUTANEOUS AT BEDTIME
Refills: 0 | Status: DISCONTINUED | OUTPATIENT
Start: 2019-12-10 | End: 2019-12-17

## 2019-12-10 RX ORDER — ENOXAPARIN SODIUM 100 MG/ML
40 INJECTION SUBCUTANEOUS DAILY
Refills: 0 | Status: DISCONTINUED | OUTPATIENT
Start: 2019-12-10 | End: 2019-12-17

## 2019-12-10 RX ORDER — AZITHROMYCIN 500 MG/1
500 TABLET, FILM COATED ORAL ONCE
Refills: 0 | Status: COMPLETED | OUTPATIENT
Start: 2019-12-10 | End: 2019-12-10

## 2019-12-10 RX ORDER — IPRATROPIUM/ALBUTEROL SULFATE 18-103MCG
3 AEROSOL WITH ADAPTER (GRAM) INHALATION ONCE
Refills: 0 | Status: COMPLETED | OUTPATIENT
Start: 2019-12-10 | End: 2019-12-10

## 2019-12-10 RX ORDER — SODIUM CHLORIDE 9 MG/ML
1000 INJECTION INTRAMUSCULAR; INTRAVENOUS; SUBCUTANEOUS ONCE
Refills: 0 | Status: COMPLETED | OUTPATIENT
Start: 2019-12-10 | End: 2019-12-10

## 2019-12-10 RX ORDER — ZOLPIDEM TARTRATE 10 MG/1
5 TABLET ORAL AT BEDTIME
Refills: 0 | Status: DISCONTINUED | OUTPATIENT
Start: 2019-12-10 | End: 2019-12-16

## 2019-12-10 RX ORDER — METFORMIN HYDROCHLORIDE 850 MG/1
1 TABLET ORAL
Qty: 0 | Refills: 0 | DISCHARGE

## 2019-12-10 RX ORDER — MONTELUKAST 4 MG/1
10 TABLET, CHEWABLE ORAL DAILY
Refills: 0 | Status: DISCONTINUED | OUTPATIENT
Start: 2019-12-10 | End: 2019-12-17

## 2019-12-10 RX ORDER — SIMVASTATIN 20 MG/1
1 TABLET, FILM COATED ORAL
Qty: 0 | Refills: 0 | DISCHARGE

## 2019-12-10 RX ORDER — DEXTROSE 50 % IN WATER 50 %
12.5 SYRINGE (ML) INTRAVENOUS ONCE
Refills: 0 | Status: DISCONTINUED | OUTPATIENT
Start: 2019-12-10 | End: 2019-12-17

## 2019-12-10 RX ORDER — MONTELUKAST 4 MG/1
1 TABLET, CHEWABLE ORAL
Qty: 0 | Refills: 0 | DISCHARGE

## 2019-12-10 RX ORDER — MAGNESIUM SULFATE 500 MG/ML
2 VIAL (ML) INJECTION ONCE
Refills: 0 | Status: COMPLETED | OUTPATIENT
Start: 2019-12-10 | End: 2019-12-10

## 2019-12-10 RX ORDER — SERTRALINE 25 MG/1
1 TABLET, FILM COATED ORAL
Qty: 0 | Refills: 0 | DISCHARGE

## 2019-12-10 RX ORDER — ALBUTEROL 90 UG/1
2 AEROSOL, METERED ORAL
Qty: 0 | Refills: 0 | DISCHARGE

## 2019-12-10 RX ORDER — LOSARTAN POTASSIUM 100 MG/1
1 TABLET, FILM COATED ORAL
Qty: 0 | Refills: 0 | DISCHARGE

## 2019-12-10 RX ORDER — OLANZAPINE 15 MG/1
10 TABLET, FILM COATED ORAL AT BEDTIME
Refills: 0 | Status: DISCONTINUED | OUTPATIENT
Start: 2019-12-10 | End: 2019-12-17

## 2019-12-10 RX ORDER — OLANZAPINE 15 MG/1
1 TABLET, FILM COATED ORAL
Qty: 0 | Refills: 0 | DISCHARGE

## 2019-12-10 RX ORDER — INSULIN LISPRO 100/ML
VIAL (ML) SUBCUTANEOUS
Refills: 0 | Status: DISCONTINUED | OUTPATIENT
Start: 2019-12-10 | End: 2019-12-17

## 2019-12-10 RX ORDER — LOSARTAN POTASSIUM 100 MG/1
100 TABLET, FILM COATED ORAL DAILY
Refills: 0 | Status: DISCONTINUED | OUTPATIENT
Start: 2019-12-10 | End: 2019-12-17

## 2019-12-10 RX ORDER — IPRATROPIUM/ALBUTEROL SULFATE 18-103MCG
3 AEROSOL WITH ADAPTER (GRAM) INHALATION EVERY 4 HOURS
Refills: 0 | Status: DISCONTINUED | OUTPATIENT
Start: 2019-12-10 | End: 2019-12-17

## 2019-12-10 RX ORDER — SODIUM CHLORIDE 9 MG/ML
1000 INJECTION, SOLUTION INTRAVENOUS
Refills: 0 | Status: DISCONTINUED | OUTPATIENT
Start: 2019-12-10 | End: 2019-12-17

## 2019-12-10 RX ORDER — INSULIN GLARGINE 100 [IU]/ML
10 INJECTION, SOLUTION SUBCUTANEOUS
Refills: 0 | Status: DISCONTINUED | OUTPATIENT
Start: 2019-12-10 | End: 2019-12-16

## 2019-12-10 RX ORDER — SERTRALINE 25 MG/1
100 TABLET, FILM COATED ORAL DAILY
Refills: 0 | Status: DISCONTINUED | OUTPATIENT
Start: 2019-12-10 | End: 2019-12-17

## 2019-12-10 RX ORDER — ZOLPIDEM TARTRATE 10 MG/1
1 TABLET ORAL
Qty: 0 | Refills: 0 | DISCHARGE

## 2019-12-10 RX ORDER — BUDESONIDE AND FORMOTEROL FUMARATE DIHYDRATE 160; 4.5 UG/1; UG/1
2 AEROSOL RESPIRATORY (INHALATION)
Refills: 0 | Status: DISCONTINUED | OUTPATIENT
Start: 2019-12-10 | End: 2019-12-17

## 2019-12-10 RX ORDER — BUDESONIDE AND FORMOTEROL FUMARATE DIHYDRATE 160; 4.5 UG/1; UG/1
2 AEROSOL RESPIRATORY (INHALATION)
Qty: 0 | Refills: 0 | DISCHARGE

## 2019-12-10 RX ORDER — AZITHROMYCIN 500 MG/1
250 TABLET, FILM COATED ORAL DAILY
Refills: 0 | Status: COMPLETED | OUTPATIENT
Start: 2019-12-11 | End: 2019-12-14

## 2019-12-10 RX ORDER — FLUTICASONE PROPIONATE 50 MCG
1 SPRAY, SUSPENSION NASAL
Refills: 0 | Status: DISCONTINUED | OUTPATIENT
Start: 2019-12-10 | End: 2019-12-17

## 2019-12-10 RX ORDER — SIMVASTATIN 20 MG/1
40 TABLET, FILM COATED ORAL AT BEDTIME
Refills: 0 | Status: DISCONTINUED | OUTPATIENT
Start: 2019-12-10 | End: 2019-12-17

## 2019-12-10 RX ORDER — GLUCAGON INJECTION, SOLUTION 0.5 MG/.1ML
1 INJECTION, SOLUTION SUBCUTANEOUS ONCE
Refills: 0 | Status: DISCONTINUED | OUTPATIENT
Start: 2019-12-10 | End: 2019-12-17

## 2019-12-10 RX ORDER — DEXTROSE 50 % IN WATER 50 %
15 SYRINGE (ML) INTRAVENOUS ONCE
Refills: 0 | Status: DISCONTINUED | OUTPATIENT
Start: 2019-12-10 | End: 2019-12-17

## 2019-12-10 RX ADMIN — AZITHROMYCIN 500 MILLIGRAM(S): 500 TABLET, FILM COATED ORAL at 22:19

## 2019-12-10 RX ADMIN — Medication 3 MILLILITER(S): at 22:21

## 2019-12-10 RX ADMIN — Medication 3 MILLILITER(S): at 14:50

## 2019-12-10 RX ADMIN — Medication 600 MILLIGRAM(S): at 22:20

## 2019-12-10 RX ADMIN — BUDESONIDE AND FORMOTEROL FUMARATE DIHYDRATE 2 PUFF(S): 160; 4.5 AEROSOL RESPIRATORY (INHALATION) at 22:21

## 2019-12-10 RX ADMIN — Medication 3 MILLILITER(S): at 10:54

## 2019-12-10 RX ADMIN — SIMVASTATIN 40 MILLIGRAM(S): 20 TABLET, FILM COATED ORAL at 21:41

## 2019-12-10 RX ADMIN — Medication 50 MILLIGRAM(S): at 04:50

## 2019-12-10 RX ADMIN — INSULIN GLARGINE 10 UNIT(S): 100 INJECTION, SOLUTION SUBCUTANEOUS at 21:43

## 2019-12-10 RX ADMIN — Medication 75 MILLIGRAM(S): at 22:19

## 2019-12-10 RX ADMIN — Medication 1 SPRAY(S): at 22:20

## 2019-12-10 RX ADMIN — Medication 3 MILLILITER(S): at 03:27

## 2019-12-10 RX ADMIN — Medication 3 MILLILITER(S): at 03:28

## 2019-12-10 RX ADMIN — MONTELUKAST 10 MILLIGRAM(S): 4 TABLET, CHEWABLE ORAL at 14:51

## 2019-12-10 RX ADMIN — Medication 75 MILLIGRAM(S): at 10:42

## 2019-12-10 RX ADMIN — Medication 6: at 10:54

## 2019-12-10 RX ADMIN — Medication 3 MILLILITER(S): at 03:48

## 2019-12-10 RX ADMIN — Medication 40 MILLIGRAM(S): at 21:24

## 2019-12-10 RX ADMIN — SODIUM CHLORIDE 500 MILLILITER(S): 9 INJECTION INTRAMUSCULAR; INTRAVENOUS; SUBCUTANEOUS at 10:42

## 2019-12-10 RX ADMIN — OLANZAPINE 10 MILLIGRAM(S): 15 TABLET, FILM COATED ORAL at 23:53

## 2019-12-10 RX ADMIN — SERTRALINE 100 MILLIGRAM(S): 25 TABLET, FILM COATED ORAL at 21:23

## 2019-12-10 RX ADMIN — LOSARTAN POTASSIUM 100 MILLIGRAM(S): 100 TABLET, FILM COATED ORAL at 21:23

## 2019-12-10 RX ADMIN — Medication 2: at 18:48

## 2019-12-10 RX ADMIN — Medication 3 MILLILITER(S): at 21:24

## 2019-12-10 RX ADMIN — SODIUM CHLORIDE 1000 MILLILITER(S): 9 INJECTION INTRAMUSCULAR; INTRAVENOUS; SUBCUTANEOUS at 05:47

## 2019-12-10 RX ADMIN — ENOXAPARIN SODIUM 40 MILLIGRAM(S): 100 INJECTION SUBCUTANEOUS at 14:50

## 2019-12-10 RX ADMIN — SODIUM CHLORIDE 1000 MILLILITER(S): 9 INJECTION INTRAMUSCULAR; INTRAVENOUS; SUBCUTANEOUS at 04:50

## 2019-12-10 RX ADMIN — Medication 50 GRAM(S): at 05:47

## 2019-12-10 NOTE — ED PROVIDER NOTE - CLINICAL SUMMARY MEDICAL DECISION MAKING FREE TEXT BOX
60 yo F c PMH of asthma, HTN, HLD, IDDM p/w 1 week h/o worsening cough with yellow, non-bloody sputum production with associated wheezing and chest pain only with cough. On exam, VS wnl, pt has expiratory wheezes, no respiratory distress. concern for pna vs viral illness vs asthma exacerbation vs bronchitis. will obtain cxr/labs. will give duonebs and reassess.

## 2019-12-10 NOTE — H&P ADULT - PROBLEM SELECTOR PLAN 4
-lactic acid 4.1 to 3.9, AG 19 ?metformin induced, no clinical sign of DKA and normal pH thus unlikely DKA, not septic with normal blood pressure  -will give another 1L of IVF and repeat bmp/vbg  -add on betahydrobuytyrate level

## 2019-12-10 NOTE — H&P ADULT - ASSESSMENT
58 y/o F with PMH of anxiety/insomnia, HTN, HLD, asthma (s/p hx of MICU/intubation years ago), type 2 DM, p/w productive cough and wheezing x 1 admitted for acute asthma exacerbation due to influenza A and RSV

## 2019-12-10 NOTE — H&P ADULT - NSICDXPASTMEDICALHX_GEN_ALL_CORE_FT
PAST MEDICAL HISTORY:  Asthma     Depressed     DM (diabetes mellitus)     High cholesterol     HTN (hypertension)

## 2019-12-10 NOTE — ED ADULT NURSE REASSESSMENT NOTE - NS ED NURSE REASSESS COMMENT FT1
Pt still has wheezing after receiving Duoneb treatments Pt is to receive Magnesium. Pt states she is comfortable in bed. Pt has no complaints at this time. Pt is still expectorating yellow sputum.

## 2019-12-10 NOTE — H&P ADULT - NSHPLABSRESULTS_GEN_ALL_CORE
personally reviewed labs:                          12.8   9.81  )-----------( 235      ( 10 Dec 2019 03:43 )             39.4       12-10    136  |  97  |  14  ----------------------------<  271<H>  4.0   |  20<L>  |  0.64    Ca    9.5      10 Dec 2019 03:43    TPro  7.6  /  Alb  4.3  /  TBili  0.2  /  DBili  x   /  AST  18  /  ALT  25  /  AlkPhos  84  12-10    Lactate 4.1 now 3.9, pH wnl    FLu and RSV positive+    personally reviewed CXR: clear, no PNA, f/u official report    eKg not done

## 2019-12-10 NOTE — CHART NOTE - NSCHARTNOTEFT_GEN_A_CORE
Lactate still elevated 4.2 from 3.9 from 4.3 despite 2L fluids, nontoxic, nonseptic, appearing patient with stable blood pressures and good perfusion. unclear etiology, ?metformin   per pulm, possibly from nebs?  also last vbg from 2:16pm was sitting in lab for at least 1 hour which can cause falsely elevated lactate levels  will repeat lactic acid now  if still elevated will check arterial ABG for arterial lactate level  EKG with NSR, flat T wave in v2, qtc 417, pulm rec z pack  monitor hd and resp status, if any changes/decompensation or rapid uptrend of lactate consult micu and given additional fluids  discussed with CHAR Reich

## 2019-12-10 NOTE — H&P ADULT - PROBLEM SELECTOR PLAN 2
start tamiflu 75mg bid  -supportive care with Mucinex Robitussin start tamiflu 75mg bid  -supportive care with Mucinex 600mg bid  -droplet, contact precautions

## 2019-12-10 NOTE — ED ADULT NURSE NOTE - OBJECTIVE STATEMENT
Pt is a 59 y Female c/o cough. PMH asthma, HTN. Pt states she has had the cough for about 1 week and has been expectorating yellow sputum. Pt has wheezing bilaterally. Pt states she only has chest pain when she coughs. Pt was given Prednisone and abx which she finished yesterday. Pt has no one who is sick at home. Pt states "I think I have pneumonia". Pt denies dizziness N/V/D, SOB. Pt is resting in bed comfortably. IV inserted and pt educated on call bell and call bell was placed at bedside.

## 2019-12-10 NOTE — H&P ADULT - PROBLEM SELECTOR PLAN 5
takes lantus 15 units bid and metformin 500mg daily  -will start lantus 10 units bid with MISS  -monitor FS  -adjust insulin as needed given steroid use

## 2019-12-10 NOTE — PROGRESS NOTE ADULT - SUBJECTIVE AND OBJECTIVE BOX
---___---___---___---___---___---___ ---___---___---___---___---___---___---___---___---                  M E D I C A L   A T T E N D I N G   P R O G R E S S   N O T E  ---___---___---___---___---___---___ ---___---___---___---___---___---___---___---___---        ================================================    ++CHIEF COMPLAINT:   Patient is a 59y old  Female who presents with a chief complaint of cough (10 Dec 2019 16:38)      Asthma with acute exacerbation and flu positive    ---___---___---___---___---___---  PAST MEDICAL / Surgical  HISTORY:  PAST MEDICAL & SURGICAL HISTORY:  Depressed  Asthma  HTN (hypertension)  High cholesterol  DM (diabetes mellitus)  No significant past surgical history      ---___---___---___---___---___---  FAMILY HISTORY:   FAMILY HISTORY:        ---___---___---___---___---___---  ALLERGIES:   Allergies    No Known Allergies    Intolerances        ---___---___---___---___---___---  MEDICATIONS:  MEDICATIONS  (STANDING):  albuterol/ipratropium for Nebulization 3 milliLiter(s) Nebulizer every 4 hours  azithromycin   Tablet 500 milliGRAM(s) Oral once  budesonide 160 MICROgram(s)/formoterol 4.5 MICROgram(s) Inhaler 2 Puff(s) Inhalation two times a day  dextrose 5%. 1000 milliLiter(s) (50 mL/Hr) IV Continuous <Continuous>  dextrose 50% Injectable 12.5 Gram(s) IV Push once  dextrose 50% Injectable 25 Gram(s) IV Push once  dextrose 50% Injectable 25 Gram(s) IV Push once  enoxaparin Injectable 40 milliGRAM(s) SubCutaneous daily  fluticasone propionate 50 MICROgram(s)/spray Nasal Spray 1 Spray(s) Both Nostrils two times a day  guaiFENesin  milliGRAM(s) Oral every 12 hours  insulin glargine Injectable (LANTUS) 10 Unit(s) SubCutaneous two times a day  insulin lispro (HumaLOG) corrective regimen sliding scale   SubCutaneous three times a day before meals  insulin lispro (HumaLOG) corrective regimen sliding scale   SubCutaneous at bedtime  losartan 100 milliGRAM(s) Oral daily  montelukast 10 milliGRAM(s) Oral daily  OLANZapine 10 milliGRAM(s) Oral at bedtime  oseltamivir 75 milliGRAM(s) Oral two times a day  predniSONE   Tablet 40 milliGRAM(s) Oral daily  sertraline 100 milliGRAM(s) Oral daily  simvastatin 40 milliGRAM(s) Oral at bedtime    MEDICATIONS  (PRN):  benzonatate 100 milliGRAM(s) Oral every 8 hours PRN Cough  dextrose 40% Gel 15 Gram(s) Oral once PRN Blood Glucose LESS THAN 70 milliGRAM(s)/deciliter  glucagon  Injectable 1 milliGRAM(s) IntraMuscular once PRN Glucose LESS THAN 70 milligrams/deciliter  zolpidem 5 milliGRAM(s) Oral at bedtime PRN Insomnia      ---___---___---___---___---___---  REVIEW OF SYSTEM:    GEN: no fever, no chills, no pain  RESP: no SOB, no cough, no sputum  CVS: no chest pain, no palpitations, no edema  GI: no abdominal pain, no nausea, no vomiting, no constipation, no diarrhea  : no dysurea, no frequency, no hematurea  Neuro: no headache, no dizziness  PSYCH: no anxiety, no depression  Derm : no itching, no rash    ---___---___---___---___---___---  VITAL SIGNS:  59y , CAPILLARY BLOOD GLUCOSE      POCT Blood Glucose.: 150 mg/dL (10 Dec 2019 21:28)    T(C): 36.7 (12-10-19 @ 21:45), Max: 36.7 (12-10-19 @ 01:17)  HR: 72 (12-10-19 @ 21:45) (66 - 98)  BP: 129/78 (12-10-19 @ 21:45) (103/69 - 136/87)  RR: 18 (12-10-19 @ 21:45) (16 - 18)  SpO2: 94% (12-10-19 @ 21:45) (94% - 96%)  ---___---___---___---___---___---  PHYSICAL EXAM:    GEN: A&O X 3 , NAD , comfortable  HEENT: NCAT, PERRL, MMM, hearing intact  Neck: supple , no JVD  CVS: S1S2 , regular , No M/R/G appreciated  PULM: mild wheezing noted on the left side   ABD.: soft. non tender, non distended,  bowel sounds present  Extrem: intact pulses , no edema   Derm: No rash , no ecchymoses  PSYCH : normal mood,  no delusion not anxious     ---___---___---___---___---___---            LAB AND IMAGIN.8   9.81  )-----------( 235      ( 10 Dec 2019 03:43 )             39.4               12-10    138  |  105  |  12  ----------------------------<  246<H>  4.3   |  21<L>  |  0.49<L>    Ca    8.6      10 Dec 2019 14:16    TPro  7.6  /  Alb  4.3  /  TBili  0.2  /  DBili  x   /  AST  18  /  ALT  25  /  AlkPhos  84  12-10                                [All pertinent / recent Imaging reviewed]         ---___---___---___---___---___---___ ---___---___---___---___---                         A S S E S S M E N T   A N D   P L A N :      HEALTH ISSUES - PROBLEM Dx  High cholesterol: High cholesterol continue sinvastatin  Anxiety: Anxiety  on zoloft  HTN (hypertension): HTN (hypertension) continue meds  Insulin dependent diabetes mellitus: Insulin dependent diabetes mellitus  continue insulin  Lactic acid increased: Lactic acid increased iv fluids  RSV (respiratory syncytial virus infection): RSV (respiratory syncytial virus infection) nebulized and prednisone  Influenza A: Influenza A tamiflu  Asthma exacerbation: Asthma exacerbation steroid taper                -GI/DVT Prophylaxis.    --------------------------------------------  Case discussed with   Education given on   ___________________________  Thank you,  Timothy Leon  [phone]

## 2019-12-10 NOTE — H&P ADULT - HISTORY OF PRESENT ILLNESS
58 y/o F with PMH of anxiety/insomnia, HTN, HLD, asthma (s/p hx of MICU/intubation years ago), type 2 DM, presents with coughing and wheezing x 1 week. Reports worsening productive cough with yellow/clear sputum and wheezing despite using her home regimen for the past 3 days. Saw her PCP 1 week ago and was giving prednisone and unknown antibiotic 1 week ago but has not help. Denies f/c, n/v, abd pain, headache, lightheadedness.  Denies any sob, maria, cp, back pain, LE pain/swelling, recent travel, sick contacts, clots    ED vitals: 98.0, 88, 136/87, 18, 95% on RA  She was given duoneb x 3, prednisone 50mg, Magnesium 1L bolus

## 2019-12-10 NOTE — ED PROVIDER NOTE - PROGRESS NOTE DETAILS
Sonenthal PGY3: cxr wnl lactate 4, CO2 and pH wnl, wbc wnl. on reassessment pt appears tired still has persistent expiratory wheezes in blll and tightness of breath sounds. pt states the last time she felt like this she developed PNA and was intubated for 1 week 3 y/a. will give Mg and admit Narayan PGY1 - Called from lab w/ critical lab value of lactate 3.5.  Called [facility], Dr. Sun, and made her aware.

## 2019-12-10 NOTE — H&P ADULT - PROBLEM SELECTOR PLAN 1
asthma exacerbation use influenza A and RSV, afebrile, no leukocytosis, no PNA on xray to suggest superimposed bacterial PNA. Satting well on RA without distress  -start duoneb q4 ATC  -s/p prednisone 50mg in ER, c/w prednisone 50mg po daily in light of no resp distress and IDDM, if worsening clinical status consider IV steriods  -continue home symbicort bid, singular 10mg daily,   -02 prn, monitor 02 sats  -pulm consult asthma exacerbation use influenza A and RSV, afebrile, no leukocytosis, no PNA on xray to suggest superimposed bacterial PNA. Satting well on RA without distress  -start duoneb q4 ATC  -s/p prednisone 50mg in ER, c/w prednisone 50mg po daily in light of no resp distress and IDDM, if worsening clinical status consider IV steriods  -continue home symbicort bid, singular 10mg daily,   -02 prn, monitor 02 sats  -check peak flow rates bid  -house pulm consult, see Dr Pereira as outpatient asthma exacerbation due to influenza A and RSV, afebrile, no leukocytosis, no infiltrate on xray to suggest superimposed bacterial PNA. Satting well on RA without distress  -start duoneb q4 ATC  -s/p prednisone 50mg in ER, c/w prednisone 50mg po daily in light of no resp distress and IDDM, if worsening clinical status consider IV steriods  -continue home symbicort bid, singular 10mg daily,   -02 prn, monitor 02 sats  -check peak flow rates bid  -house pulm consult, see Dr Pereira as outpatient

## 2019-12-10 NOTE — ED ADULT NURSE REASSESSMENT NOTE - NS ED NURSE REASSESS COMMENT FT1
report received from Peter RODRIGUEZ. Patient is a/ox4, awaiting bed at this time. Patient resting comfortably in stretcher bed. Periods of productive cough.

## 2019-12-10 NOTE — CONSULT NOTE ADULT - ASSESSMENT
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 Severe eosinophilic asthma with acute exacerbation/ Influenza A infection   - Resp status improving   - Can reduce Prednisone dose to 40 mg daily. Will require a slow taper over 10-14 days  - Agree with monitoring peak flow rate BID  - cont Duonebs Q4-6H  - Cont Symbicort BID   - Add Flonase BID for post nasal drip   - Placed on Tamiflu BID (unclear utility given that her symptoms have been present for 1 week)  - CXR clear, no evidence of PNA. Cont Azithromycin PO daily x 5 days for possible bacterial bronchitis   - Tessalon perle 100 mg Q8h prn for cough   - Incentive spirometry, acapella Q6h to assist with expectoration  - Keep O2 sat > 90 %  - Incentive spirometry     2. Lactic acidosis:  - Etiology unclear ? 2/2 frequent nebulizer Rxs  - Can check an arterial lactate level   - Pt continues to appear non toxic, cont close monitoring of resp status and hemodynamics     3. Gen:  - DVT PX: Lovenox SQ  - Outpt pulm FUP with Dr. Pereira   -

## 2019-12-10 NOTE — ED PROVIDER NOTE - OBJECTIVE STATEMENT
60 yo F c PMH of asthma, HTN, HLD, IDDM p/w 1 week h/o worsening cough with yellow, non-bloody sputum production with associated wheezing and chest pain only with cough. saw pmd 1 w/a and was given 1 week h/o prednisone and unknown abx that she finished course of yesterday. positive hx of sx in past, was PNA at that time. no cxr done at this time. no recent travel/ill contacts. no recent surgery/hospitalzation. 60 yo F c PMH of asthma (+ h/o intubation and MICU), HTN, HLD, IDDM p/w 1 week h/o worsening cough with yellow, non-bloody sputum production with associated wheezing and chest pain only with cough. saw pmd 1 w/a and was given 1 week h/o prednisone and unknown abx that she finished course of yesterday. positive hx of sx in past, was PNA at that time. no cxr done at this time. no recent travel/ill contacts. no recent surgery/hospitalzation.

## 2019-12-10 NOTE — CONSULT NOTE ADULT - SUBJECTIVE AND OBJECTIVE BOX
CHIEF COMPLAINT: SOB/ cough     HPI: 59 F never smoker with a PMH of obesity, HTN, HLD, DM type 2, severe eosinophilic asthma ( h/o intubation and multiple admissions), HUDSON ( supposed to be on CPAP 7 cm H20), now presenting with shortness of breath, cough and wheezing. Pt reports that she developed a progressively worsening cough 1 week ago. Her symptoms began after contact with her granddaughter who was sick. She then developed progressively worsening shortness of breath, wheezing and chest tightness over the course of the week. She was seen by her pulmonologist ( Dr. Pereira) on 12/3 and prescribed Prednisone 20 mg daily along with Cefuroxime but reports that her symptoms continued to worsen and prompted her to come to the ED. She denies fever, chills, N/V/D, dysuria or hematuria. In the ED lactate was 4.1, RVP was + for Influenza A. CXR was clear. Pt received IV solumedrol en route to ER and then received Prednisone 50 mg, Magnesium 2 gm, nebs and Tamiflu in ED.     PAST MEDICAL & SURGICAL HISTORY:  Depressed  Asthma  HTN (hypertension)  High cholesterol  DM (diabetes mellitus)  No significant past surgical history      FAMILY HISTORY:  no significant FH in first degree relatives     SOCIAL HISTORY:  Smoking: [x ] Never Smoked [ ] Former Smoker (__ packs x ___ years) [ ] Current Smoker  (__ packs x ___ years)  Substance Use: [x ] Never Used [ ] Used ____  EtOH Use: Social   Marital Status: [ ] Single [x ]  [ ]  [ ]   Sexual History: NC  Occupation:    Recent Travel: None  Country of Birth: [facility]   Advance Directives: Full code     Allergies    No Known Allergies    Intolerances        HOME MEDICATIONS:  Reviewed, pulm meds: Symbicort, Ventolin, Prednisone     REVIEW OF SYSTEMS:  Constitutional: [ ] negative [- ] fevers [- ] chills [ -] weight loss [ ] weight gain  HEENT: [ ] negative [ ] dry eyes [ ] eye irritation [ -] postnasal drip [ ] nasal congestion  CV: [ ] negative  [- ] chest pain [- ] orthopnea [ ] palpitations [ ] murmur  Resp: [ ] negative [+ ] cough [+ ] shortness of breath [ ] dyspnea [+] wheezing [+ ] sputum [- ] hemoptysis  GI: [ ] negative [- ] nausea [- ] vomiting [ -] diarrhea [- ] constipation [- ] abd pain [ ] dysphagia   : [ ] negative [ ] dysuria [- ] nocturia [- ] hematuria [- ] increased urinary frequency  Musculoskeletal: [ ] negative [- ] back pain [- ] myalgias [ ] arthralgias [ ] fracture  Skin: [ ] negative [- ] rash [ ] itch  Neurological: [ ] negative [ ] headache [- ] dizziness [- ] syncope [ ] weakness [ ] numbness  Psychiatric: [ ] negative [ ] anxiety [- ] depression  Endocrine: [ ] negative [ ] diabetes [- ] thyroid problem  Hematologic/Lymphatic: [ ] negative [ ] anemia [- ] bleeding problem  Allergic/Immunologic: [ ] negative [ ] itchy eyes [- ] nasal discharge [ ] hives [ ] angioedema  [x ] All other systems negative  [ ] Unable to assess ROS because ________    OBJECTIVE:  ICU Vital Signs Last 24 Hrs  T(C): 36.7 (10 Dec 2019 14:58), Max: 36.7 (10 Dec 2019 01:17)  T(F): 98.1 (10 Dec 2019 14:58), Max: 98.1 (10 Dec 2019 14:58)  HR: 83 (10 Dec 2019 14:58) (83 - 98)  BP: 103/69 (10 Dec 2019 14:58) (103/69 - 136/87)  BP(mean): --  ABP: --  ABP(mean): --  RR: 17 (10 Dec 2019 14:58) (16 - 18)  SpO2: 95% (10 Dec 2019 14:58) (94% - 96%)        CAPILLARY BLOOD GLUCOSE      POCT Blood Glucose.: 253 mg/dL (10 Dec 2019 10:48)      PHYSICAL EXAM:  General: NAD, speaking in fulls sentences, no accessory muscle use   HEENT: PERRLA  Lymph Nodes: No palpable cervical LNs  Neck: Supple  Respiratory: Mild b/l expiratory wheezing, no crackles   Cardiovascular: RRR, S1+S2+0  Abdomen: Soft, NT, ND, BS+  Extremities: No edema, pulses + b/l LEs  Skin: No rash   Neurological: AAOx3, grossly non focal   Psychiatry: Normal mood     HOSPITAL MEDICATIONS:  enoxaparin Injectable 40 milliGRAM(s) SubCutaneous daily    oseltamivir 75 milliGRAM(s) Oral two times a day    losartan 100 milliGRAM(s) Oral daily    dextrose 40% Gel 15 Gram(s) Oral once PRN  dextrose 50% Injectable 12.5 Gram(s) IV Push once  dextrose 50% Injectable 25 Gram(s) IV Push once  dextrose 50% Injectable 25 Gram(s) IV Push once  glucagon  Injectable 1 milliGRAM(s) IntraMuscular once PRN  insulin glargine Injectable (LANTUS) 10 Unit(s) SubCutaneous two times a day  insulin lispro (HumaLOG) corrective regimen sliding scale   SubCutaneous three times a day before meals  insulin lispro (HumaLOG) corrective regimen sliding scale   SubCutaneous at bedtime  predniSONE   Tablet 50 milliGRAM(s) Oral daily  simvastatin 40 milliGRAM(s) Oral at bedtime    albuterol/ipratropium for Nebulization 3 milliLiter(s) Nebulizer every 4 hours  budesonide 160 MICROgram(s)/formoterol 4.5 MICROgram(s) Inhaler 2 Puff(s) Inhalation two times a day  guaiFENesin  milliGRAM(s) Oral every 12 hours  montelukast 10 milliGRAM(s) Oral daily    OLANZapine 10 milliGRAM(s) Oral at bedtime  sertraline 100 milliGRAM(s) Oral daily  zolpidem 5 milliGRAM(s) Oral at bedtime PRN          dextrose 5%. 1000 milliLiter(s) IV Continuous <Continuous>            LABS:                        12.8   9.81  )-----------( 235      ( 10 Dec 2019 03:43 )             39.4     Hgb Trend: 12.8<--  12-10    138  |  105  |  12  ----------------------------<  246<H>  4.3   |  21<L>  |  0.49<L>    Ca    8.6      10 Dec 2019 14:16    TPro  7.6  /  Alb  4.3  /  TBili  0.2  /  DBili  x   /  AST  18  /  ALT  25  /  AlkPhos  84  12-10    Creatinine Trend: 0.49<--, 0.64<--        Venous Blood Gas:  12-10 @ 14:16  7.42/38/50/24/82  VBG Lactate: 4.2  Venous Blood Gas:  12-10 @ 07:50  7.37/41/47/24/77  VBG Lactate: 3.9  Venous Blood Gas:  12-10 @ 03:43  7.40/39/52/24/83  VBG Lactate: 4.1      MICROBIOLOGY:   Rapid Respiratory Viral Panel (12.10.19 @ 05:55)    Rapid RVP Result: Detected: This Respiratory Panel uses polymerase chain reaction (PCR) to detect for  adenovirus; coronavirus (HKU1, NL63, 229E, OC43); human metapneumovirus  (hMPV); human enterovirus/rhinovirus (Entero/RV); influenza A; influenza  A/H1; influenza A/H3; influenza A/H1-2009; influenza B; parainfluenza  viruses 1, 2, 3, 4; respiratory syncytial virus; Mycoplasma pneumoniae;  and Chlamydophila pneumoniae.      RADIOLOGY:  < from: Xray Chest 2 Views PA/Lat (12.10.19 @ 04:12) >  Lungs are clear. No pleural effusion or pneumothorax.   Cardiac size is normal. No acute osseous finding.    [x ] Reviewed and interpreted by me    PULMONARY FUNCTION TESTS:    EKG:

## 2019-12-10 NOTE — H&P ADULT - PROBLEM SELECTOR PLAN 7
depression/anxiety: stable  -c/w home sertraline 100mg daily, olanzapine 10mg qhs  -c/w home ambien prn

## 2019-12-10 NOTE — ED ADULT NURSE REASSESSMENT NOTE - NS ED NURSE REASSESS COMMENT FT1
Patient lying in bed with daughter at bedside, educated patient and daughter on droplet and contact precautions, IV intact, and cardiac monitor intact.

## 2019-12-10 NOTE — ED ADULT NURSE REASSESSMENT NOTE - NS ED NURSE REASSESS COMMENT FT1
Lab value of 4.2 lactate endorsed to floor JENNIFER Santoyo. information could not be obtained declines

## 2019-12-10 NOTE — ED PROVIDER NOTE - ATTENDING CONTRIBUTION TO CARE
59F, pmh asthma, s/p prior intubation, htn, hld, presents with cough, dyspnea, chest discomfort with cough x 1 week. Pt seen by pmd, given prednisone, abx, no sig improvement in sx. No abd pain, n/v/d. No f/c.     PE: NAD, NCAT, MMM, Trachea midline, Normal conjunctiva, lungs with dereased air movement with diffuse expiratory wheeze. S1/S2 RRR, Normal perfusion, 2+ radial pulses bilat, Abdomen Soft, NTND, No rebound/guarding, No LE edema, No deformity of extremities, No rashes,  No focal motor or sensory deficits.     Presentation most c/w pneumonia vs viral URI with asthma exacerbation. Give duonebs, steroids, check labs, cxr, re-eval. - Tommy Loredo MD

## 2019-12-11 DIAGNOSIS — Z71.89 OTHER SPECIFIED COUNSELING: ICD-10-CM

## 2019-12-11 LAB
ANION GAP SERPL CALC-SCNC: 14 MMOL/L — SIGNIFICANT CHANGE UP (ref 5–17)
BUN SERPL-MCNC: 17 MG/DL — SIGNIFICANT CHANGE UP (ref 7–23)
CALCIUM SERPL-MCNC: 8.8 MG/DL — SIGNIFICANT CHANGE UP (ref 8.4–10.5)
CHLORIDE SERPL-SCNC: 97 MMOL/L — SIGNIFICANT CHANGE UP (ref 96–108)
CO2 SERPL-SCNC: 26 MMOL/L — SIGNIFICANT CHANGE UP (ref 22–31)
CREAT SERPL-MCNC: 0.64 MG/DL — SIGNIFICANT CHANGE UP (ref 0.5–1.3)
GLUCOSE BLDC GLUCOMTR-MCNC: 181 MG/DL — HIGH (ref 70–99)
GLUCOSE BLDC GLUCOMTR-MCNC: 262 MG/DL — HIGH (ref 70–99)
GLUCOSE BLDC GLUCOMTR-MCNC: 296 MG/DL — HIGH (ref 70–99)
GLUCOSE BLDC GLUCOMTR-MCNC: 97 MG/DL — SIGNIFICANT CHANGE UP (ref 70–99)
GLUCOSE SERPL-MCNC: 230 MG/DL — HIGH (ref 70–99)
HBA1C BLD-MCNC: 8.2 % — HIGH (ref 4–5.6)
HCT VFR BLD CALC: 43.5 % — SIGNIFICANT CHANGE UP (ref 34.5–45)
HCV AB S/CO SERPL IA: 0.16 S/CO — SIGNIFICANT CHANGE UP (ref 0–0.99)
HCV AB SERPL-IMP: SIGNIFICANT CHANGE UP
HGB BLD-MCNC: 13.9 G/DL — SIGNIFICANT CHANGE UP (ref 11.5–15.5)
LACTATE SERPL-SCNC: 2.1 MMOL/L — HIGH (ref 0.7–2)
MCHC RBC-ENTMCNC: 27.5 PG — SIGNIFICANT CHANGE UP (ref 27–34)
MCHC RBC-ENTMCNC: 32 GM/DL — SIGNIFICANT CHANGE UP (ref 32–36)
MCV RBC AUTO: 86 FL — SIGNIFICANT CHANGE UP (ref 80–100)
PLATELET # BLD AUTO: 207 K/UL — SIGNIFICANT CHANGE UP (ref 150–400)
POTASSIUM SERPL-MCNC: 4.5 MMOL/L — SIGNIFICANT CHANGE UP (ref 3.5–5.3)
POTASSIUM SERPL-SCNC: 4.5 MMOL/L — SIGNIFICANT CHANGE UP (ref 3.5–5.3)
RBC # BLD: 5.06 M/UL — SIGNIFICANT CHANGE UP (ref 3.8–5.2)
RBC # FLD: 15.7 % — HIGH (ref 10.3–14.5)
SODIUM SERPL-SCNC: 137 MMOL/L — SIGNIFICANT CHANGE UP (ref 135–145)
WBC # BLD: 7.42 K/UL — SIGNIFICANT CHANGE UP (ref 3.8–10.5)
WBC # FLD AUTO: 7.42 K/UL — SIGNIFICANT CHANGE UP (ref 3.8–10.5)

## 2019-12-11 PROCEDURE — 99233 SBSQ HOSP IP/OBS HIGH 50: CPT

## 2019-12-11 RX ORDER — CHLORHEXIDINE GLUCONATE 213 G/1000ML
1 SOLUTION TOPICAL DAILY
Refills: 0 | Status: DISCONTINUED | OUTPATIENT
Start: 2019-12-11 | End: 2019-12-17

## 2019-12-11 RX ADMIN — Medication 1 SPRAY(S): at 05:18

## 2019-12-11 RX ADMIN — LOSARTAN POTASSIUM 100 MILLIGRAM(S): 100 TABLET, FILM COATED ORAL at 22:25

## 2019-12-11 RX ADMIN — Medication 2: at 10:17

## 2019-12-11 RX ADMIN — Medication 3 MILLILITER(S): at 05:18

## 2019-12-11 RX ADMIN — OLANZAPINE 10 MILLIGRAM(S): 15 TABLET, FILM COATED ORAL at 22:21

## 2019-12-11 RX ADMIN — BUDESONIDE AND FORMOTEROL FUMARATE DIHYDRATE 2 PUFF(S): 160; 4.5 AEROSOL RESPIRATORY (INHALATION) at 05:17

## 2019-12-11 RX ADMIN — SERTRALINE 100 MILLIGRAM(S): 25 TABLET, FILM COATED ORAL at 20:58

## 2019-12-11 RX ADMIN — BUDESONIDE AND FORMOTEROL FUMARATE DIHYDRATE 2 PUFF(S): 160; 4.5 AEROSOL RESPIRATORY (INHALATION) at 18:07

## 2019-12-11 RX ADMIN — Medication 3 MILLILITER(S): at 14:46

## 2019-12-11 RX ADMIN — Medication 1 SPRAY(S): at 18:07

## 2019-12-11 RX ADMIN — INSULIN GLARGINE 10 UNIT(S): 100 INJECTION, SOLUTION SUBCUTANEOUS at 22:20

## 2019-12-11 RX ADMIN — MONTELUKAST 10 MILLIGRAM(S): 4 TABLET, CHEWABLE ORAL at 20:58

## 2019-12-11 RX ADMIN — Medication 75 MILLIGRAM(S): at 14:11

## 2019-12-11 RX ADMIN — CHLORHEXIDINE GLUCONATE 1 APPLICATION(S): 213 SOLUTION TOPICAL at 14:47

## 2019-12-11 RX ADMIN — ZOLPIDEM TARTRATE 5 MILLIGRAM(S): 10 TABLET ORAL at 22:36

## 2019-12-11 RX ADMIN — ENOXAPARIN SODIUM 40 MILLIGRAM(S): 100 INJECTION SUBCUTANEOUS at 14:46

## 2019-12-11 RX ADMIN — SIMVASTATIN 40 MILLIGRAM(S): 20 TABLET, FILM COATED ORAL at 22:22

## 2019-12-11 RX ADMIN — Medication 3 MILLILITER(S): at 22:26

## 2019-12-11 RX ADMIN — Medication 75 MILLIGRAM(S): at 22:22

## 2019-12-11 RX ADMIN — Medication 6: at 14:05

## 2019-12-11 RX ADMIN — Medication 3 MILLILITER(S): at 18:07

## 2019-12-11 RX ADMIN — Medication 600 MILLIGRAM(S): at 22:22

## 2019-12-11 RX ADMIN — Medication 3 MILLILITER(S): at 10:24

## 2019-12-11 RX ADMIN — Medication 40 MILLIGRAM(S): at 22:22

## 2019-12-11 RX ADMIN — AZITHROMYCIN 250 MILLIGRAM(S): 500 TABLET, FILM COATED ORAL at 14:46

## 2019-12-11 RX ADMIN — Medication 1: at 22:26

## 2019-12-11 RX ADMIN — INSULIN GLARGINE 10 UNIT(S): 100 INJECTION, SOLUTION SUBCUTANEOUS at 10:19

## 2019-12-11 RX ADMIN — Medication 100 MILLIGRAM(S): at 20:59

## 2019-12-11 RX ADMIN — Medication 600 MILLIGRAM(S): at 10:29

## 2019-12-11 NOTE — PROGRESS NOTE ADULT - SUBJECTIVE AND OBJECTIVE BOX
---___---___---___---___---___---___ ---___---___---___---___---___---___---___---___---                  M E D I C A L   A T T E N D I N G   P R O G R E S S   N O T E  ---___---___---___---___---___---___ ---___---___---___---___---___---___---___---___---        ================================================    ++CHIEF COMPLAINT:   Patient is a 59y old  Female who presents with a chief complaint of cough (11 Dec 2019 11:16)      Asthma with acute exacerbation flu positive    ---___---___---___---___---___---  PAST MEDICAL / Surgical  HISTORY:  PAST MEDICAL & SURGICAL HISTORY:  Depressed  Asthma  HTN (hypertension)  High cholesterol  DM (diabetes mellitus)  No significant past surgical history      ---___---___---___---___---___---  FAMILY HISTORY:   FAMILY HISTORY:        ---___---___---___---___---___---  ALLERGIES:   Allergies    No Known Allergies    Intolerances        ---___---___---___---___---___---  MEDICATIONS:  MEDICATIONS  (STANDING):  albuterol/ipratropium for Nebulization 3 milliLiter(s) Nebulizer every 4 hours  azithromycin   Tablet 250 milliGRAM(s) Oral daily  budesonide 160 MICROgram(s)/formoterol 4.5 MICROgram(s) Inhaler 2 Puff(s) Inhalation two times a day  chlorhexidine 4% Liquid 1 Application(s) Topical daily  dextrose 5%. 1000 milliLiter(s) (50 mL/Hr) IV Continuous <Continuous>  dextrose 50% Injectable 12.5 Gram(s) IV Push once  dextrose 50% Injectable 25 Gram(s) IV Push once  dextrose 50% Injectable 25 Gram(s) IV Push once  enoxaparin Injectable 40 milliGRAM(s) SubCutaneous daily  fluticasone propionate 50 MICROgram(s)/spray Nasal Spray 1 Spray(s) Both Nostrils two times a day  guaiFENesin  milliGRAM(s) Oral every 12 hours  insulin glargine Injectable (LANTUS) 10 Unit(s) SubCutaneous two times a day  insulin lispro (HumaLOG) corrective regimen sliding scale   SubCutaneous three times a day before meals  insulin lispro (HumaLOG) corrective regimen sliding scale   SubCutaneous at bedtime  losartan 100 milliGRAM(s) Oral daily  montelukast 10 milliGRAM(s) Oral daily  OLANZapine 10 milliGRAM(s) Oral at bedtime  oseltamivir 75 milliGRAM(s) Oral two times a day  predniSONE   Tablet 40 milliGRAM(s) Oral daily  sertraline 100 milliGRAM(s) Oral daily  simvastatin 40 milliGRAM(s) Oral at bedtime    MEDICATIONS  (PRN):  benzonatate 100 milliGRAM(s) Oral every 8 hours PRN Cough  dextrose 40% Gel 15 Gram(s) Oral once PRN Blood Glucose LESS THAN 70 milliGRAM(s)/deciliter  glucagon  Injectable 1 milliGRAM(s) IntraMuscular once PRN Glucose LESS THAN 70 milligrams/deciliter  zolpidem 5 milliGRAM(s) Oral at bedtime PRN Insomnia      ---___---___---___---___---___---  REVIEW OF SYSTEM:    GEN: no fever, no chills, no pain  RESP: no SOB, no cough, no sputum  CVS: no chest pain, no palpitations, no edema  GI: no abdominal pain, no nausea, no vomiting, no constipation, no diarrhea  : no dysurea, no frequency, no hematurea  Neuro: no headache, no dizziness  PSYCH: no anxiety, no depression  Derm : no itching, no rash    ---___---___---___---___---___---  VITAL SIGNS:  59y , CAPILLARY BLOOD GLUCOSE      POCT Blood Glucose.: 97 mg/dL (11 Dec 2019 17:48)    T(C): 36.9 (19 @ 16:47), Max: 36.9 (19 @ 09:27)  HR: 84 (19 @ 16:47) (72 - 84)  BP: 108/66 (19 @ 16:47) (108/66 - 129/78)  RR: 18 (19 @ 16:47) (18 - 18)  SpO2: 96% (19 @ 16:47) (92% - 96%)  ---___---___---___---___---___---  PHYSICAL EXAM:    GEN: A&O X 3 , NAD , comfortable  HEENT: NCAT, PERRL, MMM, hearing intact  Neck: supple , no JVD  CVS: S1S2 , regular , No M/R/G appreciated  PULM: wheezing bilaterally   ABD.: soft. non tender, non distended,  bowel sounds present  Extrem: intact pulses , no edema   Derm: No rash , no ecchymoses  PSYCH : normal mood,  no delusion not anxious     ---___---___---___---___---___---            LAB AND IMAGIN.9   7.42  )-----------( 207      ( 11 Dec 2019 08:27 )             43.5               12-11    137  |  97  |  17  ----------------------------<  230<H>  4.5   |  26  |  0.64    Ca    8.8      11 Dec 2019 07:04    TPro  7.6  /  Alb  4.3  /  TBili  0.2  /  DBili  x   /  AST  18  /  ALT  25  /  AlkPhos  84  12-10                                [All pertinent / recent Imaging reviewed]         ---___---___---___---___---___---___ ---___---___---___---___---                         A S S E S S M E N T   A N D   P L A N :      HEALTH ISSUES - PROBLEM Dx:  Need for prophylactic measure: Need for prophylactic measure  High cholesterol: High cholesterol continue meds  Anxiety: Anxiety continue meds  HTN (hypertension): HTN (hypertension) stable   Insulin dependent diabetes mellitus: Insulin dependent diabetes mellitus on insulin level good   Lactic acid increased: Lactic acid increased decreasing      Influenza A: Influenza A continue tamiflu   Asthma exacerbation: Asthma exacerbation   on nebulizer and steroids as per pulmonary              -GI/DVT Prophylaxis.    --------------------------------------------  Case discussed with   Education given on   ___________________________  Thank you,  Timothy Leon  [phone]

## 2019-12-11 NOTE — PROGRESS NOTE ADULT - SUBJECTIVE AND OBJECTIVE BOX
CHIEF COMPLAINT:    Interval Events:    REVIEW OF SYSTEMS:  Constitutional: [ ] negative [ ] fevers [ ] chills [ ] weight loss [ ] weight gain  HEENT: [ ] negative [ ] dry eyes [ ] eye irritation [ ] postnasal drip [ ] nasal congestion  CV: [ ] negative  [ ] chest pain [ ] orthopnea [ ] palpitations [ ] murmur  Resp: [ ] negative [ ] cough [ ] shortness of breath [ ] dyspnea [ ] wheezing [ ] sputum [ ] hemoptysis  GI: [ ] negative [ ] nausea [ ] vomiting [ ] diarrhea [ ] constipation [ ] abd pain [ ] dysphagia   : [ ] negative [ ] dysuria [ ] nocturia [ ] hematuria [ ] increased urinary frequency  Musculoskeletal: [ ] negative [ ] back pain [ ] myalgias [ ] arthralgias [ ] fracture  Skin: [ ] negative [ ] rash [ ] itch  Neurological: [ ] negative [ ] headache [ ] dizziness [ ] syncope [ ] weakness [ ] numbness  Psychiatric: [ ] negative [ ] anxiety [ ] depression  Endocrine: [ ] negative [ ] diabetes [ ] thyroid problem  Hematologic/Lymphatic: [ ] negative [ ] anemia [ ] bleeding problem  Allergic/Immunologic: [ ] negative [ ] itchy eyes [ ] nasal discharge [ ] hives [ ] angioedema  [ ] All other systems negative  [ ] Unable to assess ROS because ________    OBJECTIVE:  ICU Vital Signs Last 24 Hrs  T(C): 36.9 (11 Dec 2019 09:27), Max: 36.9 (11 Dec 2019 09:27)  T(F): 98.4 (11 Dec 2019 09:27), Max: 98.4 (11 Dec 2019 09:27)  HR: 73 (11 Dec 2019 09:27) (66 - 83)  BP: 124/78 (11 Dec 2019 09:27) (103/69 - 131/76)  BP(mean): --  ABP: --  ABP(mean): --  RR: 18 (11 Dec 2019 09:27) (16 - 18)  SpO2: 94% (11 Dec 2019 09:27) (92% - 95%)        12-10 @ 07:01  -  12-11 @ 07:00  --------------------------------------------------------  IN: 900 mL / OUT: 0 mL / NET: 900 mL      CAPILLARY BLOOD GLUCOSE      POCT Blood Glucose.: 181 mg/dL (11 Dec 2019 09:19)      PHYSICAL EXAM:  General:   HEENT:   Lymph Nodes:  Neck:   Respiratory:   Cardiovascular:   Abdomen:   Extremities:   Skin:   Neurological:  Psychiatry:    HOSPITAL MEDICATIONS:  enoxaparin Injectable 40 milliGRAM(s) SubCutaneous daily    azithromycin   Tablet 250 milliGRAM(s) Oral daily  oseltamivir 75 milliGRAM(s) Oral two times a day    losartan 100 milliGRAM(s) Oral daily    dextrose 40% Gel 15 Gram(s) Oral once PRN  dextrose 50% Injectable 12.5 Gram(s) IV Push once  dextrose 50% Injectable 25 Gram(s) IV Push once  dextrose 50% Injectable 25 Gram(s) IV Push once  glucagon  Injectable 1 milliGRAM(s) IntraMuscular once PRN  insulin glargine Injectable (LANTUS) 10 Unit(s) SubCutaneous two times a day  insulin lispro (HumaLOG) corrective regimen sliding scale   SubCutaneous three times a day before meals  insulin lispro (HumaLOG) corrective regimen sliding scale   SubCutaneous at bedtime  predniSONE   Tablet 40 milliGRAM(s) Oral daily  simvastatin 40 milliGRAM(s) Oral at bedtime    albuterol/ipratropium for Nebulization 3 milliLiter(s) Nebulizer every 4 hours  benzonatate 100 milliGRAM(s) Oral every 8 hours PRN  budesonide 160 MICROgram(s)/formoterol 4.5 MICROgram(s) Inhaler 2 Puff(s) Inhalation two times a day  guaiFENesin  milliGRAM(s) Oral every 12 hours  montelukast 10 milliGRAM(s) Oral daily    OLANZapine 10 milliGRAM(s) Oral at bedtime  sertraline 100 milliGRAM(s) Oral daily  zolpidem 5 milliGRAM(s) Oral at bedtime PRN          dextrose 5%. 1000 milliLiter(s) IV Continuous <Continuous>      chlorhexidine 4% Liquid 1 Application(s) Topical daily  fluticasone propionate 50 MICROgram(s)/spray Nasal Spray 1 Spray(s) Both Nostrils two times a day        LABS:                        13.9   7.42  )-----------( 207      ( 11 Dec 2019 08:27 )             43.5     Hgb Trend: 13.9<--, 12.8<--  12-11    137  |  97  |  17  ----------------------------<  230<H>  4.5   |  26  |  0.64    Ca    8.8      11 Dec 2019 07:04    TPro  7.6  /  Alb  4.3  /  TBili  0.2  /  DBili  x   /  AST  18  /  ALT  25  /  AlkPhos  84  12-10    Creatinine Trend: 0.64<--, 0.49<--, 0.64<--        Venous Blood Gas:  12-10 @ 14:16  7.42/38/50/24/82  VBG Lactate: 4.2  Venous Blood Gas:  12-10 @ 07:50  7.37/41/47/24/77  VBG Lactate: 3.9  Venous Blood Gas:  12-10 @ 03:43  7.40/39/52/24/83  VBG Lactate: 4.1      MICROBIOLOGY:     RADIOLOGY:  [ ] Reviewed and interpreted by me    PULMONARY FUNCTION TESTS:    EKG: CHIEF COMPLAINT: Cough/ SOB/ Wheezing    Interval Events: No acute events overnight. Reports improvement in shortness of breath and wheezing. Still with ongoing cough with yellow sputum     REVIEW OF SYSTEMS:  Constitutional: [ ] negative [- ] fevers [- ] chills [ -] weight loss [ ] weight gain  HEENT: [ ] negative [ ] dry eyes [ ] eye irritation [ -] postnasal drip [ ] nasal congestion  CV: [ ] negative  [- ] chest pain [- ] orthopnea [ ] palpitations [ ] murmur  Resp: [ ] negative [+ ] cough [+ ] shortness of breath [ ] dyspnea [+] wheezing [+ ] sputum [- ] hemoptysis  GI: [ ] negative [- ] nausea [- ] vomiting [ -] diarrhea [- ] constipation [- ] abd pain [ ] dysphagia   : [ ] negative [ ] dysuria [- ] nocturia [- ] hematuria [- ] increased urinary frequency  Musculoskeletal: [ ] negative [- ] back pain [- ] myalgias [ ] arthralgias [ ] fracture  Skin: [ ] negative [- ] rash [ ] itch  Neurological: [ ] negative [ ] headache [- ] dizziness [- ] syncope [ ] weakness [ ] numbness  Psychiatric: [ ] negative [ ] anxiety [- ] depression  Endocrine: [ ] negative [ ] diabetes [- ] thyroid problem  Hematologic/Lymphatic: [ ] negative [ ] anemia [- ] bleeding problem  Allergic/Immunologic: [ ] negative [ ] itchy eyes [- ] nasal discharge [ ] hives [ ] angioedema  [x ] All other systems negative  [ ] Unable to assess ROS because ________    OBJECTIVE:  ICU Vital Signs Last 24 Hrs  T(C): 36.9 (11 Dec 2019 09:27), Max: 36.9 (11 Dec 2019 09:27)  T(F): 98.4 (11 Dec 2019 09:27), Max: 98.4 (11 Dec 2019 09:27)  HR: 73 (11 Dec 2019 09:27) (66 - 83)  BP: 124/78 (11 Dec 2019 09:27) (103/69 - 131/76)  BP(mean): --  ABP: --  ABP(mean): --  RR: 18 (11 Dec 2019 09:27) (16 - 18)  SpO2: 94% (11 Dec 2019 09:27) (92% - 95%)        12-10 @ 07:01  -  12-11 @ 07:00  --------------------------------------------------------  IN: 900 mL / OUT: 0 mL / NET: 900 mL      CAPILLARY BLOOD GLUCOSE      POCT Blood Glucose.: 181 mg/dL (11 Dec 2019 09:19)      PHYSICAL EXAM:  General: NAD, speaking in fulls sentences, no accessory muscle use   HEENT: PERRLA  Lymph Nodes: No palpable cervical LNs  Neck: Supple  Respiratory: Mild b/l expiratory wheezing, no crackles   Cardiovascular: RRR, S1+S2+0  Abdomen: Soft, NT, ND, BS+  Extremities: No edema, pulses + b/l LEs  Skin: No rash   Neurological: AAOx3, grossly non focal   Psychiatry: Normal mood     HOSPITAL MEDICATIONS:  enoxaparin Injectable 40 milliGRAM(s) SubCutaneous daily    azithromycin   Tablet 250 milliGRAM(s) Oral daily  oseltamivir 75 milliGRAM(s) Oral two times a day    losartan 100 milliGRAM(s) Oral daily    dextrose 40% Gel 15 Gram(s) Oral once PRN  dextrose 50% Injectable 12.5 Gram(s) IV Push once  dextrose 50% Injectable 25 Gram(s) IV Push once  dextrose 50% Injectable 25 Gram(s) IV Push once  glucagon  Injectable 1 milliGRAM(s) IntraMuscular once PRN  insulin glargine Injectable (LANTUS) 10 Unit(s) SubCutaneous two times a day  insulin lispro (HumaLOG) corrective regimen sliding scale   SubCutaneous three times a day before meals  insulin lispro (HumaLOG) corrective regimen sliding scale   SubCutaneous at bedtime  predniSONE   Tablet 40 milliGRAM(s) Oral daily  simvastatin 40 milliGRAM(s) Oral at bedtime    albuterol/ipratropium for Nebulization 3 milliLiter(s) Nebulizer every 4 hours  benzonatate 100 milliGRAM(s) Oral every 8 hours PRN  budesonide 160 MICROgram(s)/formoterol 4.5 MICROgram(s) Inhaler 2 Puff(s) Inhalation two times a day  guaiFENesin  milliGRAM(s) Oral every 12 hours  montelukast 10 milliGRAM(s) Oral daily    OLANZapine 10 milliGRAM(s) Oral at bedtime  sertraline 100 milliGRAM(s) Oral daily  zolpidem 5 milliGRAM(s) Oral at bedtime PRN          dextrose 5%. 1000 milliLiter(s) IV Continuous <Continuous>      chlorhexidine 4% Liquid 1 Application(s) Topical daily  fluticasone propionate 50 MICROgram(s)/spray Nasal Spray 1 Spray(s) Both Nostrils two times a day        LABS:                        13.9   7.42  )-----------( 207      ( 11 Dec 2019 08:27 )             43.5     Hgb Trend: 13.9<--, 12.8<--  12-11    137  |  97  |  17  ----------------------------<  230<H>  4.5   |  26  |  0.64    Ca    8.8      11 Dec 2019 07:04    TPro  7.6  /  Alb  4.3  /  TBili  0.2  /  DBili  x   /  AST  18  /  ALT  25  /  AlkPhos  84  12-10    Creatinine Trend: 0.64<--, 0.49<--, 0.64<--        Venous Blood Gas:  12-10 @ 14:16  7.42/38/50/24/82  VBG Lactate: 4.2  Venous Blood Gas:  12-10 @ 07:50  7.37/41/47/24/77  VBG Lactate: 3.9  Venous Blood Gas:  12-10 @ 03:43  7.40/39/52/24/83  VBG Lactate: 4.1      MICROBIOLOGY:   Rapid Respiratory Viral Panel (12.10.19 @ 05:55)    Rapid RVP Result: Detected: This Respiratory Panel uses polymerase chain reaction (PCR) to detect for  adenovirus; coronavirus (HKU1, NL63, 229E, OC43); human metapneumovirus  (hMPV); human enterovirus/rhinovirus (Entero/RV); influenza A; influenza  A/H1; influenza A/H3; influenza A/H1-2009; influenza B; parainfluenza  viruses 1, 2, 3, 4; respiratory syncytial virus; Mycoplasma pneumoniae;  and Chlamydophila pneumoniae.      RADIOLOGY:  < from: Xray Chest 2 Views PA/Lat (12.10.19 @ 04:12) >  Clear lungs      [ x] Reviewed and interpreted by me    PULMONARY FUNCTION TESTS:    EKG:

## 2019-12-11 NOTE — PROGRESS NOTE ADULT - ASSESSMENT
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 Severe eosinophilic asthma with acute exacerbation/ Influenza A infection   - Resp status continues to improve, O@ sat 94-95 % RA, wheezing improving   - Cont Prednisone dose to 40 mg daily. Will require a slow taper over 10-14 days  - Monitor peak flow rate BID  - cont Duonebs Q6H but can make them prn now   - Cont Symbicort BID   - Cont Flonase BID for post nasal drip   - Remains on Tamiflu BID   - CXR clear, no evidence of PNA. Cont Azithromycin PO daily x 5 days for possible bacterial bronchitis   - Tessalon perle 100 mg Q8h prn for cough   - Incentive spirometry, acapella Q6h to assist with expectoration  - Keep O2 sat > 90 %  - Incentive spirometry     2. Lactic acidosis:  - Etiology unclear ? 2/2 frequent nebulizer Rxs  - Lactate level improving, now decreased to 2.1    - Pt continues to appear non toxic, cont close monitoring of resp status and hemodynamics     3. Gen:  - DVT PX: Lovenox SQ  - Outpt pulm FUP with Dr. Pereira

## 2019-12-12 LAB
ANION GAP SERPL CALC-SCNC: 15 MMOL/L — SIGNIFICANT CHANGE UP (ref 5–17)
BUN SERPL-MCNC: 21 MG/DL — SIGNIFICANT CHANGE UP (ref 7–23)
CALCIUM SERPL-MCNC: 9.1 MG/DL — SIGNIFICANT CHANGE UP (ref 8.4–10.5)
CHLORIDE SERPL-SCNC: 95 MMOL/L — LOW (ref 96–108)
CO2 SERPL-SCNC: 25 MMOL/L — SIGNIFICANT CHANGE UP (ref 22–31)
CREAT SERPL-MCNC: 0.6 MG/DL — SIGNIFICANT CHANGE UP (ref 0.5–1.3)
GLUCOSE BLDC GLUCOMTR-MCNC: 224 MG/DL — HIGH (ref 70–99)
GLUCOSE BLDC GLUCOMTR-MCNC: 241 MG/DL — HIGH (ref 70–99)
GLUCOSE BLDC GLUCOMTR-MCNC: 269 MG/DL — HIGH (ref 70–99)
GLUCOSE BLDC GLUCOMTR-MCNC: 277 MG/DL — HIGH (ref 70–99)
GLUCOSE SERPL-MCNC: 305 MG/DL — HIGH (ref 70–99)
LACTATE SERPL-SCNC: 3.4 MMOL/L — HIGH (ref 0.7–2)
POTASSIUM SERPL-MCNC: 4.7 MMOL/L — SIGNIFICANT CHANGE UP (ref 3.5–5.3)
POTASSIUM SERPL-SCNC: 4.7 MMOL/L — SIGNIFICANT CHANGE UP (ref 3.5–5.3)
SODIUM SERPL-SCNC: 135 MMOL/L — SIGNIFICANT CHANGE UP (ref 135–145)

## 2019-12-12 PROCEDURE — 99233 SBSQ HOSP IP/OBS HIGH 50: CPT

## 2019-12-12 RX ADMIN — Medication 100 MILLIGRAM(S): at 22:30

## 2019-12-12 RX ADMIN — BUDESONIDE AND FORMOTEROL FUMARATE DIHYDRATE 2 PUFF(S): 160; 4.5 AEROSOL RESPIRATORY (INHALATION) at 17:41

## 2019-12-12 RX ADMIN — BUDESONIDE AND FORMOTEROL FUMARATE DIHYDRATE 2 PUFF(S): 160; 4.5 AEROSOL RESPIRATORY (INHALATION) at 06:23

## 2019-12-12 RX ADMIN — Medication 3 MILLILITER(S): at 10:32

## 2019-12-12 RX ADMIN — Medication 40 MILLIGRAM(S): at 10:29

## 2019-12-12 RX ADMIN — Medication 3 MILLILITER(S): at 22:28

## 2019-12-12 RX ADMIN — Medication 3 MILLILITER(S): at 17:41

## 2019-12-12 RX ADMIN — SERTRALINE 100 MILLIGRAM(S): 25 TABLET, FILM COATED ORAL at 11:40

## 2019-12-12 RX ADMIN — Medication 3 MILLILITER(S): at 06:24

## 2019-12-12 RX ADMIN — CHLORHEXIDINE GLUCONATE 1 APPLICATION(S): 213 SOLUTION TOPICAL at 11:41

## 2019-12-12 RX ADMIN — Medication 6: at 13:37

## 2019-12-12 RX ADMIN — Medication 1 SPRAY(S): at 06:24

## 2019-12-12 RX ADMIN — Medication 6: at 18:14

## 2019-12-12 RX ADMIN — ZOLPIDEM TARTRATE 5 MILLIGRAM(S): 10 TABLET ORAL at 22:33

## 2019-12-12 RX ADMIN — INSULIN GLARGINE 10 UNIT(S): 100 INJECTION, SOLUTION SUBCUTANEOUS at 09:30

## 2019-12-12 RX ADMIN — Medication 3 MILLILITER(S): at 13:38

## 2019-12-12 RX ADMIN — Medication 600 MILLIGRAM(S): at 22:29

## 2019-12-12 RX ADMIN — Medication 20 MILLIGRAM(S): at 22:35

## 2019-12-12 RX ADMIN — INSULIN GLARGINE 10 UNIT(S): 100 INJECTION, SOLUTION SUBCUTANEOUS at 22:33

## 2019-12-12 RX ADMIN — Medication 100 MILLIGRAM(S): at 10:32

## 2019-12-12 RX ADMIN — ENOXAPARIN SODIUM 40 MILLIGRAM(S): 100 INJECTION SUBCUTANEOUS at 11:40

## 2019-12-12 RX ADMIN — MONTELUKAST 10 MILLIGRAM(S): 4 TABLET, CHEWABLE ORAL at 11:41

## 2019-12-12 RX ADMIN — Medication 75 MILLIGRAM(S): at 22:29

## 2019-12-12 RX ADMIN — AZITHROMYCIN 250 MILLIGRAM(S): 500 TABLET, FILM COATED ORAL at 11:40

## 2019-12-12 RX ADMIN — SIMVASTATIN 40 MILLIGRAM(S): 20 TABLET, FILM COATED ORAL at 22:29

## 2019-12-12 RX ADMIN — Medication 75 MILLIGRAM(S): at 10:32

## 2019-12-12 RX ADMIN — Medication 1 SPRAY(S): at 17:42

## 2019-12-12 RX ADMIN — Medication 4: at 09:30

## 2019-12-12 RX ADMIN — OLANZAPINE 10 MILLIGRAM(S): 15 TABLET, FILM COATED ORAL at 22:29

## 2019-12-12 RX ADMIN — Medication 600 MILLIGRAM(S): at 10:32

## 2019-12-12 NOTE — PROGRESS NOTE ADULT - SUBJECTIVE AND OBJECTIVE BOX
---___---___---___---___---___---___ ---___---___---___---___---___---___---___---___---                  M E D I C A L   A T T E N D I N G   P R O G R E S S   N O T E  ---___---___---___---___---___---___ ---___---___---___---___---___---___---___---___---        ================================================    ++CHIEF COMPLAINT:   Patient is a 59y old  Female who presents with a chief complaint of cough (12 Dec 2019 10:35)      Asthma with acute exacerbation  still wheezing   ---___---___---___---___---___---  PAST MEDICAL / Surgical  HISTORY:  PAST MEDICAL & SURGICAL HISTORY:  Depressed  Asthma  HTN (hypertension)  High cholesterol  DM (diabetes mellitus)  No significant past surgical history      ---___---___---___---___---___---  FAMILY HISTORY:   FAMILY HISTORY:        ---___---___---___---___---___---  ALLERGIES:   Allergies    No Known Allergies    Intolerances        ---___---___---___---___---___---  MEDICATIONS:  MEDICATIONS  (STANDING):  albuterol/ipratropium for Nebulization 3 milliLiter(s) Nebulizer every 4 hours  azithromycin   Tablet 250 milliGRAM(s) Oral daily  budesonide 160 MICROgram(s)/formoterol 4.5 MICROgram(s) Inhaler 2 Puff(s) Inhalation two times a day  chlorhexidine 4% Liquid 1 Application(s) Topical daily  dextrose 5%. 1000 milliLiter(s) (50 mL/Hr) IV Continuous <Continuous>  dextrose 50% Injectable 12.5 Gram(s) IV Push once  dextrose 50% Injectable 25 Gram(s) IV Push once  dextrose 50% Injectable 25 Gram(s) IV Push once  enoxaparin Injectable 40 milliGRAM(s) SubCutaneous daily  fluticasone propionate 50 MICROgram(s)/spray Nasal Spray 1 Spray(s) Both Nostrils two times a day  guaiFENesin  milliGRAM(s) Oral every 12 hours  insulin glargine Injectable (LANTUS) 10 Unit(s) SubCutaneous two times a day  insulin lispro (HumaLOG) corrective regimen sliding scale   SubCutaneous three times a day before meals  insulin lispro (HumaLOG) corrective regimen sliding scale   SubCutaneous at bedtime  losartan 100 milliGRAM(s) Oral daily  methylPREDNISolone sodium succinate Injectable 20 milliGRAM(s) IV Push every 8 hours  montelukast 10 milliGRAM(s) Oral daily  OLANZapine 10 milliGRAM(s) Oral at bedtime  oseltamivir 75 milliGRAM(s) Oral two times a day  sertraline 100 milliGRAM(s) Oral daily  simvastatin 40 milliGRAM(s) Oral at bedtime    MEDICATIONS  (PRN):  benzonatate 100 milliGRAM(s) Oral every 8 hours PRN Cough  dextrose 40% Gel 15 Gram(s) Oral once PRN Blood Glucose LESS THAN 70 milliGRAM(s)/deciliter  glucagon  Injectable 1 milliGRAM(s) IntraMuscular once PRN Glucose LESS THAN 70 milligrams/deciliter  zolpidem 5 milliGRAM(s) Oral at bedtime PRN Insomnia      ---___---___---___---___---___---  REVIEW OF SYSTEM:    GEN: no fever, no chills, no pain  RESP: no SOB, no cough, no sputum  CVS: no chest pain, no palpitations, no edema  GI: no abdominal pain, no nausea, no vomiting, no constipation, no diarrhea  : no dysurea, no frequency, no hematurea  Neuro: no headache, no dizziness  PSYCH: no anxiety, no depression  Derm : no itching, no rash    ---___---___---___---___---___---  VITAL SIGNS:  59y , CAPILLARY BLOOD GLUCOSE      POCT Blood Glucose.: 224 mg/dL (12 Dec 2019 21:59)    T(C): 36.7 (19 @ 22:45), Max: 36.7 (19 @ 04:29)  HR: 70 (19 @ 22:45) (70 - 95)  BP: 126/77 (19 @ 22:45) (105/70 - 126/77)  RR: 18 (19 @ 22:45) (18 - 18)  SpO2: 94% (19 @ 22:45) (92% - 97%)  ---___---___---___---___---___---  PHYSICAL EXAM:    GEN: A&O X 3 , NAD , comfortable  HEENT: NCAT, PERRL, MMM, hearing intact  Neck: supple , no JVD  CVS: S1S2 , regular , No M/R/G appreciated  PULM:  diffuse wheezing   ABD.: soft. non tender, non distended,  bowel sounds present  Extrem: intact pulses , no edema   Derm: No rash , no ecchymoses  PSYCH : normal mood,  no delusion not anxious     ---___---___---___---___---___---            LAB AND IMAGIN.9   7.42  )-----------( 207      ( 11 Dec 2019 08:27 )             43.5               12-12    135  |  95<L>  |  21  ----------------------------<  305<H>  4.7   |  25  |  0.60    Ca    9.1      12 Dec 2019 07:21                                  [All pertinent / recent Imaging reviewed]         ---___---___---___---___---___---___ ---___---___---___---___---                         A S S E S S M E N T   A N D   P L A N :      HEALTH ISSUES - PROBLEM Dx:  Need for prophylactic measure: Need for prophylactic measure  High cholesterol: High cholesterol continue meds   Anxiety: Anxiety continue meds  HTN (hypertension): HTN (hypertension) stable   Insulin dependent diabetes mellitus: Insulin dependent diabetes mellitus continue insulin coverage  Lactic acid increased: Lactic acid increased improviing   RSV (respiratory syncytial virus infection): RSV (respiratory syncytial virus infection) resolved   Influenza A: Influenza A on tamiflu  Asthma exacerbation: Asthma exacerbation will start iv steroids as per  pulmonary                 -GI/DVT Prophylaxis.    --------------------------------------------  Case discussed with   Education given on   ___________________________  Thank you,  Timothy Leon  [phone]

## 2019-12-12 NOTE — DIETITIAN INITIAL EVALUATION ADULT. - PHYSICAL APPEARANCE
other (specify)/well nourished/No overt signs of muscle mass or body fat depletion on visual exam Ht:5'1" , Wt: 174.1 lbs (wt 12/11), BMI: 32.9 kg/m2, IBW: 105 lbs +/- 10%, %IBW: 166%  No edema, Skin intact per nursing flowsheets

## 2019-12-12 NOTE — DIETITIAN INITIAL EVALUATION ADULT. - ADD RECOMMEND
1. Continue to encourage po intake and obtain/honor food preferences as able, 2. Monitor PO intake, diet tolerance, weight trends, labs, and skin integrity, 3. provide education reinforcement as needed

## 2019-12-12 NOTE — DIETITIAN INITIAL EVALUATION ADULT. - OTHER INFO
Diet PTA: Pt reports eating well with good appetite at home consuming 2-3 meals per day consisting of a variety of foods. Diet recall: Breakfast: egg with cereal and milk, Lunch: protein, rice, veggie at times. Pt will drink mostly water or diet sodas, will snack on low calorie cookies at times.     Pt reports she makes an effort to avoid concentrated sweets and will try to consume whole grains over refined grains, pt monitors blood glucose twice daily, morning values  and afternoon values 126-136, pt reports last HgbA1C was taken 1 month ago with value of 7.2%, current A1C elevated to 8.2% 12/11, likely partially attributed to steroids, noted pt was on steroids for 1 week PTA and continues to receive steroid in house.     Pt reports history of intended weight loss from previous weight of 198 lbs (3 months ago) to current weight 174 lbs where pt has been stable for 1 month, consistent with more recent weight 174.1 lbs (12/11), noted admission weight 178.7 lbs (12/10).    Pt with no known food allergies, takes vitamin D, multivitamin and vitamin C. No N+V, last BM today. No difficulty chewing/swallowing.    Diet education: RD reviewed heart healthy consistent carbohydrate diet guidelines specifically reviewed sources of carbohydrate and protein, portion sizes using my plate method, choosing whole grains, pairing carbohydrate with protein, avoidance of concentrated sweets, preparing foods utilized lower fat cooking methods such as baking and grilling, choosing low fat diary products and not adding additional salt to foods after cooking, utilizing more herbs and spices to flavor foods in lieu of salt.

## 2019-12-12 NOTE — PROGRESS NOTE ADULT - ASSESSMENT
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 Severe eosinophilic asthma with acute exacerbation/ Influenza A infection   - Reports increasing wheezing and chest tightness today  - Will switch from PO Prednisone to solumedrol 20 mg IV Q8H today  - Once resp status stabilizes, can transition back to Prednisone dose to 40 mg daily and taper slowly over 10-14 days  - Please provide her with a peak flow meter and monitor peak flow rate BID  - cont Duonebs Q6H standing  - Cont Symbicort BID   - Cont Flonase BID for post nasal drip   - Remains on Tamiflu BID ( unclear efficacy given that she had a week of symptoms prior to presentation)   - CXR clear, no evidence of PNA. Cont Azithromycin PO daily x 5 days for possible bacterial bronchitis   - Tessalon perle 100 mg Q8h standing for cough   - Incentive spirometry, acapella Q6h to assist with expectoration  - Keep O2 sat > 90 %  - Incentive spirometry     2. Lactic acidosis:  - Etiology unclear ? 2/2 frequent nebulizer Rxs  - Lactate level improved to 2.1 and now back up to 3.4     - Pt continues to appear non toxic, cont close monitoring of resp status and hemodynamics     3. Gen:  - DVT PX: Lovenox SQ  - Outpt pulm FUP with Dr. Pereira

## 2019-12-12 NOTE — DIETITIAN INITIAL EVALUATION ADULT. - PROBLEM SELECTOR PLAN 1
asthma exacerbation due to influenza A and RSV, afebrile, no leukocytosis, no infiltrate on xray to suggest superimposed bacterial PNA. Satting well on RA without distress  -start duoneb q4 ATC  -s/p prednisone 50mg in ER, c/w prednisone 50mg po daily in light of no resp distress and IDDM, if worsening clinical status consider IV steriods  -continue home symbicort bid, singular 10mg daily,   -02 prn, monitor 02 sats  -check peak flow rates bid  -house pulm consult, see Dr Pereira as outpatient

## 2019-12-12 NOTE — DIETITIAN INITIAL EVALUATION ADULT. - REASON INDICATOR FOR ASSESSMENT
Pt seen for nutrition consult for registered dietitian. Information obtained from pt.    Pt is a 59 year old female with PMH Including HTN, hyperlipidemia, asthma, type 2 DM (on lantus and metformin) admitted with coughing/wheezing for 1 week found to have asthma exacerbation due to influenza A and RSV.

## 2019-12-12 NOTE — PROGRESS NOTE ADULT - SUBJECTIVE AND OBJECTIVE BOX
CHIEF COMPLAINT: SOB/ cough/ wheezing     Interval Events: Reports ongoing cough, chest itghtnes and wheezing. Feels that SOB and wheezing is not improving. Afebrile No CP    REVIEW OF SYSTEMS:  Constitutional: [ ] negative [- ] fevers [- ] chills [ -] weight loss [ ] weight gain  HEENT: [ ] negative [ ] dry eyes [ ] eye irritation [ -] postnasal drip [ ] nasal congestion  CV: [ ] negative  [- ] chest pain [- ] orthopnea [ ] palpitations [ ] murmur  Resp: [ ] negative [+ ] cough [+ ] shortness of breath [ ] dyspnea [+] wheezing [+ ] sputum [- ] hemoptysis  GI: [ ] negative [- ] nausea [- ] vomiting [ -] diarrhea [- ] constipation [- ] abd pain [ ] dysphagia   : [ ] negative [ ] dysuria [- ] nocturia [- ] hematuria [- ] increased urinary frequency  Musculoskeletal: [ ] negative [- ] back pain [- ] myalgias [ ] arthralgias [ ] fracture  Skin: [ ] negative [- ] rash [ ] itch  Neurological: [ ] negative [ ] headache [- ] dizziness [- ] syncope [ ] weakness [ ] numbness  Psychiatric: [ ] negative [ ] anxiety [- ] depression  Endocrine: [ ] negative [ ] diabetes [- ] thyroid problem  Hematologic/Lymphatic: [ ] negative [ ] anemia [- ] bleeding problem  Allergic/Immunologic: [ ] negative [ ] itchy eyes [- ] nasal discharge [ ] hives [ ] angioedema  [x ] All other systems negative  [ ] Unable to assess ROS because ________    OBJECTIVE:  ICU Vital Signs Last 24 Hrs  T(C): 36.6 (12 Dec 2019 09:02), Max: 36.9 (11 Dec 2019 16:47)  T(F): 97.8 (12 Dec 2019 09:02), Max: 98.4 (11 Dec 2019 16:47)  HR: 86 (12 Dec 2019 09:02) (82 - 87)  BP: 105/70 (12 Dec 2019 09:02) (105/70 - 125/84)  BP(mean): --  ABP: --  ABP(mean): --  RR: 18 (12 Dec 2019 09:02) (18 - 18)  SpO2: 92% (12 Dec 2019 09:02) (92% - 96%)        CAPILLARY BLOOD GLUCOSE      POCT Blood Glucose.: 269 mg/dL (12 Dec 2019 13:15)      PHYSICAL EXAM:  General: NAD, speaking in fulls sentences, no accessory muscle use   HEENT: PERRLA  Lymph Nodes: No palpable cervical LNs  Neck: Supple  Respiratory: Still with b/l expiratory wheezing, no crackles   Cardiovascular: RRR, S1+S2+0  Abdomen: Soft, NT, ND, BS+  Extremities: No edema, pulses + b/l LEs  Skin: No rash   Neurological: AAOx3, grossly non focal   Psychiatry: Normal mood     HOSPITAL MEDICATIONS:  enoxaparin Injectable 40 milliGRAM(s) SubCutaneous daily    azithromycin   Tablet 250 milliGRAM(s) Oral daily  oseltamivir 75 milliGRAM(s) Oral two times a day    losartan 100 milliGRAM(s) Oral daily    dextrose 40% Gel 15 Gram(s) Oral once PRN  dextrose 50% Injectable 12.5 Gram(s) IV Push once  dextrose 50% Injectable 25 Gram(s) IV Push once  dextrose 50% Injectable 25 Gram(s) IV Push once  glucagon  Injectable 1 milliGRAM(s) IntraMuscular once PRN  insulin glargine Injectable (LANTUS) 10 Unit(s) SubCutaneous two times a day  insulin lispro (HumaLOG) corrective regimen sliding scale   SubCutaneous three times a day before meals  insulin lispro (HumaLOG) corrective regimen sliding scale   SubCutaneous at bedtime  predniSONE   Tablet 40 milliGRAM(s) Oral daily  simvastatin 40 milliGRAM(s) Oral at bedtime    albuterol/ipratropium for Nebulization 3 milliLiter(s) Nebulizer every 4 hours  benzonatate 100 milliGRAM(s) Oral every 8 hours PRN  budesonide 160 MICROgram(s)/formoterol 4.5 MICROgram(s) Inhaler 2 Puff(s) Inhalation two times a day  guaiFENesin  milliGRAM(s) Oral every 12 hours  montelukast 10 milliGRAM(s) Oral daily    OLANZapine 10 milliGRAM(s) Oral at bedtime  sertraline 100 milliGRAM(s) Oral daily  zolpidem 5 milliGRAM(s) Oral at bedtime PRN          dextrose 5%. 1000 milliLiter(s) IV Continuous <Continuous>      chlorhexidine 4% Liquid 1 Application(s) Topical daily  fluticasone propionate 50 MICROgram(s)/spray Nasal Spray 1 Spray(s) Both Nostrils two times a day        LABS:                        13.9   7.42  )-----------( 207      ( 11 Dec 2019 08:27 )             43.5     Hgb Trend: 13.9<--, 12.8<--  12-12    135  |  95<L>  |  21  ----------------------------<  305<H>  4.7   |  25  |  0.60    Ca    9.1      12 Dec 2019 07:21      Creatinine Trend: 0.60<--, 0.64<--, 0.49<--, 0.64<--        Venous Blood Gas:  12-10 @ 14:16  7.42/38/50/24/82  VBG Lactate: 4.2      MICROBIOLOGY:   Rapid Respiratory Viral Panel (12.10.19 @ 05:55)    Rapid RVP Result: Detected: This Respiratory Panel uses polymerase chain reaction (PCR) to detect for  adenovirus; coronavirus (HKU1, NL63, 229E, OC43); human metapneumovirus  (hMPV); human enterovirus/rhinovirus (Entero/RV); influenza A; influenza  A/H1; influenza A/H3; influenza A/H1-2009; influenza B; parainfluenza  viruses 1, 2, 3, 4; respiratory syncytial virus; Mycoplasma pneumoniae;  and Chlamydophila pneumoniae.      RADIOLOGY:  < from: Xray Chest 2 Views PA/Lat (12.10.19 @ 04:12) >  Lungs are clear. No pleural effusion or pneumothorax.   Cardiac size is normal. No acute osseous finding.    IMPRESSION:   Clear lungs    [x ] Reviewed and interpreted by me    PULMONARY FUNCTION TESTS:    EKG:

## 2019-12-13 LAB
ANION GAP SERPL CALC-SCNC: 14 MMOL/L — SIGNIFICANT CHANGE UP (ref 5–17)
BUN SERPL-MCNC: 20 MG/DL — SIGNIFICANT CHANGE UP (ref 7–23)
CALCIUM SERPL-MCNC: 9 MG/DL — SIGNIFICANT CHANGE UP (ref 8.4–10.5)
CHLORIDE SERPL-SCNC: 95 MMOL/L — LOW (ref 96–108)
CO2 SERPL-SCNC: 26 MMOL/L — SIGNIFICANT CHANGE UP (ref 22–31)
CREAT SERPL-MCNC: 0.56 MG/DL — SIGNIFICANT CHANGE UP (ref 0.5–1.3)
GLUCOSE BLDC GLUCOMTR-MCNC: 215 MG/DL — HIGH (ref 70–99)
GLUCOSE BLDC GLUCOMTR-MCNC: 280 MG/DL — HIGH (ref 70–99)
GLUCOSE BLDC GLUCOMTR-MCNC: 298 MG/DL — HIGH (ref 70–99)
GLUCOSE BLDC GLUCOMTR-MCNC: 328 MG/DL — HIGH (ref 70–99)
GLUCOSE SERPL-MCNC: 277 MG/DL — HIGH (ref 70–99)
HCT VFR BLD CALC: 40.9 % — SIGNIFICANT CHANGE UP (ref 34.5–45)
HGB BLD-MCNC: 13.4 G/DL — SIGNIFICANT CHANGE UP (ref 11.5–15.5)
LACTATE SERPL-SCNC: 2.8 MMOL/L — HIGH (ref 0.7–2)
MCHC RBC-ENTMCNC: 27.9 PG — SIGNIFICANT CHANGE UP (ref 27–34)
MCHC RBC-ENTMCNC: 32.8 GM/DL — SIGNIFICANT CHANGE UP (ref 32–36)
MCV RBC AUTO: 85.2 FL — SIGNIFICANT CHANGE UP (ref 80–100)
PLATELET # BLD AUTO: 240 K/UL — SIGNIFICANT CHANGE UP (ref 150–400)
POTASSIUM SERPL-MCNC: 4.6 MMOL/L — SIGNIFICANT CHANGE UP (ref 3.5–5.3)
POTASSIUM SERPL-SCNC: 4.6 MMOL/L — SIGNIFICANT CHANGE UP (ref 3.5–5.3)
RBC # BLD: 4.8 M/UL — SIGNIFICANT CHANGE UP (ref 3.8–5.2)
RBC # FLD: 15.5 % — HIGH (ref 10.3–14.5)
SODIUM SERPL-SCNC: 135 MMOL/L — SIGNIFICANT CHANGE UP (ref 135–145)
WBC # BLD: 10.11 K/UL — SIGNIFICANT CHANGE UP (ref 3.8–10.5)
WBC # FLD AUTO: 10.11 K/UL — SIGNIFICANT CHANGE UP (ref 3.8–10.5)

## 2019-12-13 PROCEDURE — 99233 SBSQ HOSP IP/OBS HIGH 50: CPT | Mod: GC

## 2019-12-13 RX ADMIN — CHLORHEXIDINE GLUCONATE 1 APPLICATION(S): 213 SOLUTION TOPICAL at 13:12

## 2019-12-13 RX ADMIN — Medication 1 SPRAY(S): at 06:38

## 2019-12-13 RX ADMIN — ENOXAPARIN SODIUM 40 MILLIGRAM(S): 100 INJECTION SUBCUTANEOUS at 13:07

## 2019-12-13 RX ADMIN — Medication 20 MILLIGRAM(S): at 13:07

## 2019-12-13 RX ADMIN — Medication 6: at 13:58

## 2019-12-13 RX ADMIN — Medication 3 MILLILITER(S): at 09:31

## 2019-12-13 RX ADMIN — Medication 75 MILLIGRAM(S): at 09:31

## 2019-12-13 RX ADMIN — Medication 3 MILLILITER(S): at 06:32

## 2019-12-13 RX ADMIN — SERTRALINE 100 MILLIGRAM(S): 25 TABLET, FILM COATED ORAL at 13:07

## 2019-12-13 RX ADMIN — OLANZAPINE 10 MILLIGRAM(S): 15 TABLET, FILM COATED ORAL at 21:42

## 2019-12-13 RX ADMIN — Medication 3 MILLILITER(S): at 21:43

## 2019-12-13 RX ADMIN — AZITHROMYCIN 250 MILLIGRAM(S): 500 TABLET, FILM COATED ORAL at 13:08

## 2019-12-13 RX ADMIN — INSULIN GLARGINE 10 UNIT(S): 100 INJECTION, SOLUTION SUBCUTANEOUS at 09:51

## 2019-12-13 RX ADMIN — Medication 100 MILLIGRAM(S): at 22:46

## 2019-12-13 RX ADMIN — Medication 20 MILLIGRAM(S): at 06:31

## 2019-12-13 RX ADMIN — Medication 6: at 18:12

## 2019-12-13 RX ADMIN — Medication 4: at 09:51

## 2019-12-13 RX ADMIN — Medication 600 MILLIGRAM(S): at 21:43

## 2019-12-13 RX ADMIN — Medication 3 MILLILITER(S): at 13:07

## 2019-12-13 RX ADMIN — MONTELUKAST 10 MILLIGRAM(S): 4 TABLET, CHEWABLE ORAL at 13:08

## 2019-12-13 RX ADMIN — Medication 600 MILLIGRAM(S): at 09:31

## 2019-12-13 RX ADMIN — SIMVASTATIN 40 MILLIGRAM(S): 20 TABLET, FILM COATED ORAL at 21:42

## 2019-12-13 RX ADMIN — Medication 20 MILLIGRAM(S): at 21:44

## 2019-12-13 RX ADMIN — Medication 2: at 21:42

## 2019-12-13 RX ADMIN — INSULIN GLARGINE 10 UNIT(S): 100 INJECTION, SOLUTION SUBCUTANEOUS at 21:43

## 2019-12-13 RX ADMIN — BUDESONIDE AND FORMOTEROL FUMARATE DIHYDRATE 2 PUFF(S): 160; 4.5 AEROSOL RESPIRATORY (INHALATION) at 06:38

## 2019-12-13 RX ADMIN — ZOLPIDEM TARTRATE 5 MILLIGRAM(S): 10 TABLET ORAL at 22:06

## 2019-12-13 RX ADMIN — Medication 1 SPRAY(S): at 18:12

## 2019-12-13 RX ADMIN — Medication 100 MILLIGRAM(S): at 06:32

## 2019-12-13 RX ADMIN — Medication 75 MILLIGRAM(S): at 21:42

## 2019-12-13 RX ADMIN — BUDESONIDE AND FORMOTEROL FUMARATE DIHYDRATE 2 PUFF(S): 160; 4.5 AEROSOL RESPIRATORY (INHALATION) at 18:12

## 2019-12-13 RX ADMIN — Medication 3 MILLILITER(S): at 18:13

## 2019-12-13 RX ADMIN — LOSARTAN POTASSIUM 100 MILLIGRAM(S): 100 TABLET, FILM COATED ORAL at 06:32

## 2019-12-13 NOTE — PROGRESS NOTE ADULT - ASSESSMENT
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 Severe eosinophilic asthma with acute exacerbation/ Influenza A infection   - Reports improvement with switch to IV steroids  - outpatient evaluation for biologic therapy  - Continue Solumedrol 20mg Q8 today and would switch to Q12 in next 24-48 hours if continued improvement  - Once resp status stabilizes, can transition back to Prednisone dose to 40 mg daily and taper slowly over 10-14 days  - Please provide her with a peak flow meter and monitor peak flow rate BID  - cont Duonebs Q6H standing  - Cont Symbicort BID   - Cont Flonase BID for post nasal drip   - Remains on Tamiflu BID   - CXR clear, no evidence of PNA. Cont Azithromycin PO daily x 5 days for possible bacterial bronchitis   - check sputum culture   - Tessalon perle 100 mg Q8h standing for cough   - Incentive spirometry, acapella Q6h to assist with expectoration  - Keep O2 sat > 90 %  - Incentive spirometry     2. Influenza A infection  - has had symptoms for some time but now appears to be improving on current therapy  - Continue Tamiflu to complete course    3. Gen:  - DVT PX: Lovenox SQ  - Maintain O2 sat > 90%  - OOB and ambulation - check ambulatory O2 saturation  - Outpt pulm FUP with Dr. Pereira

## 2019-12-13 NOTE — PROGRESS NOTE ADULT - ATTENDING COMMENTS
Over the weekend I will be covered by the Pulmonary Service. Please call [phone] with any questions this weekend.

## 2019-12-13 NOTE — PROGRESS NOTE ADULT - SUBJECTIVE AND OBJECTIVE BOX
---___---___---___---___---___---___ ---___---___---___---___---___---___---___---___---                  M E D I C A L   A T T E N D I N G   P R O G R E S S   N O T E  ---___---___---___---___---___---___ ---___---___---___---___---___---___---___---___---        ================================================    ++CHIEF COMPLAINT:   Patient is a 59y old  Female who presents with a chief complaint of cough (13 Dec 2019 11:53)      Asthma with acute exacerbation still wheezing     ---___---___---___---___---___---  PAST MEDICAL / Surgical  HISTORY:  PAST MEDICAL & SURGICAL HISTORY:  Depressed  Asthma  HTN (hypertension)  High cholesterol  DM (diabetes mellitus)  No significant past surgical history      ---___---___---___---___---___---  FAMILY HISTORY:   FAMILY HISTORY:        ---___---___---___---___---___---  ALLERGIES:   Allergies    No Known Allergies    Intolerances        ---___---___---___---___---___---  MEDICATIONS:  MEDICATIONS  (STANDING):  albuterol/ipratropium for Nebulization 3 milliLiter(s) Nebulizer every 4 hours  azithromycin   Tablet 250 milliGRAM(s) Oral daily  budesonide 160 MICROgram(s)/formoterol 4.5 MICROgram(s) Inhaler 2 Puff(s) Inhalation two times a day  chlorhexidine 4% Liquid 1 Application(s) Topical daily  dextrose 5%. 1000 milliLiter(s) (50 mL/Hr) IV Continuous <Continuous>  dextrose 50% Injectable 12.5 Gram(s) IV Push once  dextrose 50% Injectable 25 Gram(s) IV Push once  dextrose 50% Injectable 25 Gram(s) IV Push once  enoxaparin Injectable 40 milliGRAM(s) SubCutaneous daily  fluticasone propionate 50 MICROgram(s)/spray Nasal Spray 1 Spray(s) Both Nostrils two times a day  guaiFENesin  milliGRAM(s) Oral every 12 hours  insulin glargine Injectable (LANTUS) 10 Unit(s) SubCutaneous two times a day  insulin lispro (HumaLOG) corrective regimen sliding scale   SubCutaneous three times a day before meals  insulin lispro (HumaLOG) corrective regimen sliding scale   SubCutaneous at bedtime  losartan 100 milliGRAM(s) Oral daily  methylPREDNISolone sodium succinate Injectable 20 milliGRAM(s) IV Push every 8 hours  montelukast 10 milliGRAM(s) Oral daily  OLANZapine 10 milliGRAM(s) Oral at bedtime  oseltamivir 75 milliGRAM(s) Oral two times a day  sertraline 100 milliGRAM(s) Oral daily  simvastatin 40 milliGRAM(s) Oral at bedtime    MEDICATIONS  (PRN):  benzonatate 100 milliGRAM(s) Oral every 8 hours PRN Cough  dextrose 40% Gel 15 Gram(s) Oral once PRN Blood Glucose LESS THAN 70 milliGRAM(s)/deciliter  glucagon  Injectable 1 milliGRAM(s) IntraMuscular once PRN Glucose LESS THAN 70 milligrams/deciliter  zolpidem 5 milliGRAM(s) Oral at bedtime PRN Insomnia      ---___---___---___---___---___---  REVIEW OF SYSTEM:    GEN: no fever, no chills, no pain  RESP: no SOB, no cough, no sputum  CVS: no chest pain, no palpitations, no edema  GI: no abdominal pain, no nausea, no vomiting, no constipation, no diarrhea  : no dysurea, no frequency, no hematurea  Neuro: no headache, no dizziness  PSYCH: no anxiety, no depression  Derm : no itching, no rash    ---___---___---___---___---___---  VITAL SIGNS:  59y , CAPILLARY BLOOD GLUCOSE      POCT Blood Glucose.: 298 mg/dL (13 Dec 2019 13:56)    T(C): 36.7 (19 @ 11:54), Max: 36.8 (19 @ 06:27)  HR: 93 (19 @ 11:54) (70 - 95)  BP: 110/71 (19 @ 11:54) (109/78 - 129/83)  RR: 18 (19 @ 11:54) (18 - 18)  SpO2: 93% (19 @ 11:54) (93% - 97%)  ---___---___---___---___---___---  PHYSICAL EXAM:    GEN: A&O X 3 , NAD , comfortable  HEENT: NCAT, PERRL, MMM, hearing intact  Neck: supple , no JVD  CVS: S1S2 , regular , No M/R/G appreciated  PULM: severe wheezing   ABD.: soft. non tender, non distended,  bowel sounds present  Extrem: intact pulses , no edema   Derm: No rash , no ecchymoses  PSYCH : normal mood,  no delusion not anxious     ---___---___---___---___---___---            LAB AND IMAGIN.4   10.11 )-----------( 240      ( 13 Dec 2019 09:30 )             40.9               -    135  |  95<L>  |  20  ----------------------------<  277<H>  4.6   |  26  |  0.56    Ca    9.0      13 Dec 2019 06:56                                  [All pertinent / recent Imaging reviewed]         ---___---___---___---___---___---___ ---___---___---___---___---                         A S S E S S M E N T   A N D   P L A N :      HEALTH ISSUES - PROBLEM Dx:     High cholesterol: High cholesterol continue meds  Anxiety: Anxiety continue meds  HTN (hypertension): HTN (hypertension) stable  Insulin dependent diabetes mellitus: Insulin dependent diabetes mellitus on insulin   Lactic acid increased: Lactic acid increased improved     Influenza A: Influenza A tamiflu   Asthma exacerbation: Asthma exacerbation on iv steroids  peak flow 300                 -GI/DVT Prophylaxis.    --------------------------------------------  Case discussed with   Education given on   ___________________________  Thank you,  Timothy Leon  [phone]

## 2019-12-13 NOTE — PROGRESS NOTE ADULT - SUBJECTIVE AND OBJECTIVE BOX
[facility] DIVISION OF PULMONARY, CRITICAL CARE and SLEEP MEDICINE  PULMONARY PROGRESS NOTE  OFFICE NUMBER: [phone]    PATIENT INFORMATION:  NAME: SHEREE HDZ:  MRN: [id number]    CHIEF COMPLAINT: Patient is a 59y old  Female who presents with a chief complaint of cough (12 Dec 2019 23:12)      [x] INITIAL CONSULT, H&P, FAMILY HISTORY and PAST MEDICAL AND SURGICAL HISTORY REVIEWED    OVERNIGHT EVENTS or CHANGES TO HPI:   No acute complaints overnight. Patient continues to wheeze but feels better compared to yesterday.  She has a productive cough.  She is ambulating around her room.    ========================REVIEW OF SYSTEMS========================  CONSTITUTIONAL: Overall feeling improved   CARDIOVASCULAR: Denies chest pain or palpitations   PULMONARY: Wheezing improved, cough with yellow sputum, no hemoptysis  [x] REMAINING REVIEW OF SYSTEMS NEGATIVE  [] UNABLE TO OBTAIN REVIEW OF SYSTEMS DUE TO _______________    ========================MEDICATIONS=============================  MEDICATIONS  (STANDING):  albuterol/ipratropium for Nebulization 3 milliLiter(s) Nebulizer every 4 hours  azithromycin   Tablet 250 milliGRAM(s) Oral daily  budesonide 160 MICROgram(s)/formoterol 4.5 MICROgram(s) Inhaler 2 Puff(s) Inhalation two times a day  chlorhexidine 4% Liquid 1 Application(s) Topical daily  dextrose 5%. 1000 milliLiter(s) (50 mL/Hr) IV Continuous <Continuous>  dextrose 50% Injectable 12.5 Gram(s) IV Push once  dextrose 50% Injectable 25 Gram(s) IV Push once  dextrose 50% Injectable 25 Gram(s) IV Push once  enoxaparin Injectable 40 milliGRAM(s) SubCutaneous daily  fluticasone propionate 50 MICROgram(s)/spray Nasal Spray 1 Spray(s) Both Nostrils two times a day  guaiFENesin  milliGRAM(s) Oral every 12 hours  insulin glargine Injectable (LANTUS) 10 Unit(s) SubCutaneous two times a day  insulin lispro (HumaLOG) corrective regimen sliding scale   SubCutaneous three times a day before meals  insulin lispro (HumaLOG) corrective regimen sliding scale   SubCutaneous at bedtime  losartan 100 milliGRAM(s) Oral daily  methylPREDNISolone sodium succinate Injectable 20 milliGRAM(s) IV Push every 8 hours  montelukast 10 milliGRAM(s) Oral daily  OLANZapine 10 milliGRAM(s) Oral at bedtime  oseltamivir 75 milliGRAM(s) Oral two times a day  sertraline 100 milliGRAM(s) Oral daily  simvastatin 40 milliGRAM(s) Oral at bedtime      MEDICATIONS  (PRN):  benzonatate 100 milliGRAM(s) Oral every 8 hours PRN Cough  dextrose 40% Gel 15 Gram(s) Oral once PRN Blood Glucose LESS THAN 70 milliGRAM(s)/deciliter  glucagon  Injectable 1 milliGRAM(s) IntraMuscular once PRN Glucose LESS THAN 70 milligrams/deciliter  zolpidem 5 milliGRAM(s) Oral at bedtime PRN Insomnia      ========================PHYSICAL EXAM============================    VITALS: ICU Vital Signs Last 24 Hrs  T(C): 36.8 (13 Dec 2019 06:27), Max: 36.8 (13 Dec 2019 06:27)  T(F): 98.2 (13 Dec 2019 06:27), Max: 98.2 (13 Dec 2019 06:27)  HR: 88 (13 Dec 2019 06:27) (70 - 95)  BP: 129/83 (13 Dec 2019 06:27) (109/78 - 129/83)  RR: 18 (13 Dec 2019 06:27) (18 - 18)  SpO2: 94% (13 Dec 2019 06:27) (92% - 97%)      INTAKE and OUTPUT: I&O's Summary    12 Dec 2019 07:01  -  13 Dec 2019 07:00  --------------------------------------------------------  IN: 360 mL / OUT: 0 mL / NET: 360 mL        VENTILATOR SETTINGS: N/A    GENERAL: NAD, comfortable  EYES: anicteric, EOMI  EAR/NOSE/MOUTH/THROAT: NCAT, MMM, nares clear, trachea midline  NECK: supple, no JVD  LYMPH NODES: no palpable supraclavicular LAD   CARDIOVASCULAR: RRR, S1S2  RESPIRATORY: bilateral expiratory wheezing, no crackles  ABDOMEN: soft, NT, ND, +BS  EXTREMITIES: no clubbing or c yanosis  SKIN: warm and dry   MUSCULOSKELETAL: strength intact  NEUROLOGIC: nonfocal exam, moves all extremities   PSYCHIATRIC: calm     ========================LABORATORY RESULTS AND IMAGING=============                        13.4   10.11 )-----------( 240      ( 13 Dec 2019 09:30 )             40.9                                                    12-13    135  |  95<L>  |  20  ----------------------------<  277<H>  4.6   |  26  |  0.56    Ca    9.0      13 Dec 2019 06:56      Creatinine Trend: 0.56<--, 0.60<--, 0.64<--, 0.49<--, 0.64<--    CXR:   < from: Xray Chest 2 Views PA/Lat (12.10.19 @ 04:12) >  FINDINGS:     Lungs are clear. No pleural effusion or pneumothorax.   Cardiac size is normal. No acute osseous finding.    IMPRESSION:   Clear lungs    < end of copied text >    [] RADIOLOGY REVIEWED AND INTERPRETED BY ME      THANK YOU FOR ALLOWING US TO PARTICIPATE IN THE CARE OF THIS PATIENT

## 2019-12-14 LAB
GLUCOSE BLDC GLUCOMTR-MCNC: 223 MG/DL — HIGH (ref 70–99)
GLUCOSE BLDC GLUCOMTR-MCNC: 291 MG/DL — HIGH (ref 70–99)
GLUCOSE BLDC GLUCOMTR-MCNC: 324 MG/DL — HIGH (ref 70–99)
GLUCOSE BLDC GLUCOMTR-MCNC: 336 MG/DL — HIGH (ref 70–99)

## 2019-12-14 RX ADMIN — INSULIN GLARGINE 10 UNIT(S): 100 INJECTION, SOLUTION SUBCUTANEOUS at 09:20

## 2019-12-14 RX ADMIN — Medication 3 MILLILITER(S): at 17:40

## 2019-12-14 RX ADMIN — MONTELUKAST 10 MILLIGRAM(S): 4 TABLET, CHEWABLE ORAL at 12:29

## 2019-12-14 RX ADMIN — Medication 20 MILLIGRAM(S): at 06:32

## 2019-12-14 RX ADMIN — BUDESONIDE AND FORMOTEROL FUMARATE DIHYDRATE 2 PUFF(S): 160; 4.5 AEROSOL RESPIRATORY (INHALATION) at 06:32

## 2019-12-14 RX ADMIN — Medication 600 MILLIGRAM(S): at 12:28

## 2019-12-14 RX ADMIN — ZOLPIDEM TARTRATE 5 MILLIGRAM(S): 10 TABLET ORAL at 21:58

## 2019-12-14 RX ADMIN — Medication 600 MILLIGRAM(S): at 22:00

## 2019-12-14 RX ADMIN — Medication 2: at 22:01

## 2019-12-14 RX ADMIN — SIMVASTATIN 40 MILLIGRAM(S): 20 TABLET, FILM COATED ORAL at 21:58

## 2019-12-14 RX ADMIN — OLANZAPINE 10 MILLIGRAM(S): 15 TABLET, FILM COATED ORAL at 21:58

## 2019-12-14 RX ADMIN — Medication 75 MILLIGRAM(S): at 23:21

## 2019-12-14 RX ADMIN — INSULIN GLARGINE 10 UNIT(S): 100 INJECTION, SOLUTION SUBCUTANEOUS at 22:00

## 2019-12-14 RX ADMIN — Medication 3 MILLILITER(S): at 13:25

## 2019-12-14 RX ADMIN — ENOXAPARIN SODIUM 40 MILLIGRAM(S): 100 INJECTION SUBCUTANEOUS at 12:29

## 2019-12-14 RX ADMIN — Medication 3 MILLILITER(S): at 06:32

## 2019-12-14 RX ADMIN — Medication 100 MILLIGRAM(S): at 22:01

## 2019-12-14 RX ADMIN — Medication 8: at 13:24

## 2019-12-14 RX ADMIN — AZITHROMYCIN 250 MILLIGRAM(S): 500 TABLET, FILM COATED ORAL at 12:28

## 2019-12-14 RX ADMIN — Medication 6: at 17:53

## 2019-12-14 RX ADMIN — Medication 1 SPRAY(S): at 17:41

## 2019-12-14 RX ADMIN — SERTRALINE 100 MILLIGRAM(S): 25 TABLET, FILM COATED ORAL at 12:27

## 2019-12-14 RX ADMIN — Medication 3 MILLILITER(S): at 22:01

## 2019-12-14 RX ADMIN — BUDESONIDE AND FORMOTEROL FUMARATE DIHYDRATE 2 PUFF(S): 160; 4.5 AEROSOL RESPIRATORY (INHALATION) at 17:40

## 2019-12-14 RX ADMIN — Medication 3 MILLILITER(S): at 10:00

## 2019-12-14 RX ADMIN — Medication 20 MILLIGRAM(S): at 21:59

## 2019-12-14 RX ADMIN — LOSARTAN POTASSIUM 100 MILLIGRAM(S): 100 TABLET, FILM COATED ORAL at 12:29

## 2019-12-14 RX ADMIN — Medication 20 MILLIGRAM(S): at 13:24

## 2019-12-14 RX ADMIN — Medication 75 MILLIGRAM(S): at 14:59

## 2019-12-14 RX ADMIN — Medication 4: at 09:20

## 2019-12-14 RX ADMIN — CHLORHEXIDINE GLUCONATE 1 APPLICATION(S): 213 SOLUTION TOPICAL at 12:29

## 2019-12-14 RX ADMIN — Medication 1 SPRAY(S): at 06:32

## 2019-12-14 NOTE — PROGRESS NOTE ADULT - SUBJECTIVE AND OBJECTIVE BOX
---___---___---___---___---___---___ ---___---___---___---___---___---___---___---___---                  M E D I C A L   A T T E N D I N G   P R O G R E S S   N O T E  ---___---___---___---___---___---___ ---___---___---___---___---___---___---___---___---        ================================================    ++CHIEF COMPLAINT:   Patient is a 59y old  Female who presents with a chief complaint of cough (13 Dec 2019 15:02)      Asthma with acute exacerbation  and flu   ---___---___---___---___---___---  PAST MEDICAL / Surgical  HISTORY:  PAST MEDICAL & SURGICAL HISTORY:  Depressed  Asthma  HTN (hypertension)  High cholesterol  DM (diabetes mellitus)  No significant past surgical history      ---___---___---___---___---___---  FAMILY HISTORY:   FAMILY HISTORY:        ---___---___---___---___---___---  ALLERGIES:   Allergies    No Known Allergies    Intolerances        ---___---___---___---___---___---  MEDICATIONS:  MEDICATIONS  (STANDING):  albuterol/ipratropium for Nebulization 3 milliLiter(s) Nebulizer every 4 hours  azithromycin   Tablet 250 milliGRAM(s) Oral daily  budesonide 160 MICROgram(s)/formoterol 4.5 MICROgram(s) Inhaler 2 Puff(s) Inhalation two times a day  chlorhexidine 4% Liquid 1 Application(s) Topical daily  dextrose 5%. 1000 milliLiter(s) (50 mL/Hr) IV Continuous <Continuous>  dextrose 50% Injectable 12.5 Gram(s) IV Push once  dextrose 50% Injectable 25 Gram(s) IV Push once  dextrose 50% Injectable 25 Gram(s) IV Push once  enoxaparin Injectable 40 milliGRAM(s) SubCutaneous daily  fluticasone propionate 50 MICROgram(s)/spray Nasal Spray 1 Spray(s) Both Nostrils two times a day  guaiFENesin  milliGRAM(s) Oral every 12 hours  insulin glargine Injectable (LANTUS) 10 Unit(s) SubCutaneous two times a day  insulin lispro (HumaLOG) corrective regimen sliding scale   SubCutaneous three times a day before meals  insulin lispro (HumaLOG) corrective regimen sliding scale   SubCutaneous at bedtime  losartan 100 milliGRAM(s) Oral daily  methylPREDNISolone sodium succinate Injectable 20 milliGRAM(s) IV Push every 8 hours  montelukast 10 milliGRAM(s) Oral daily  OLANZapine 10 milliGRAM(s) Oral at bedtime  oseltamivir 75 milliGRAM(s) Oral two times a day  sertraline 100 milliGRAM(s) Oral daily  simvastatin 40 milliGRAM(s) Oral at bedtime    MEDICATIONS  (PRN):  benzonatate 100 milliGRAM(s) Oral every 8 hours PRN Cough  dextrose 40% Gel 15 Gram(s) Oral once PRN Blood Glucose LESS THAN 70 milliGRAM(s)/deciliter  glucagon  Injectable 1 milliGRAM(s) IntraMuscular once PRN Glucose LESS THAN 70 milligrams/deciliter  zolpidem 5 milliGRAM(s) Oral at bedtime PRN Insomnia      ---___---___---___---___---___---  REVIEW OF SYSTEM:    GEN: no fever, no chills, no pain  RESP: no SOB, no cough, no sputum  CVS: no chest pain, no palpitations, no edema  GI: no abdominal pain, no nausea, no vomiting, no constipation, no diarrhea  : no dysurea, no frequency, no hematurea  Neuro: no headache, no dizziness  PSYCH: no anxiety, no depression  Derm : no itching, no rash    ---___---___---___---___---___---  VITAL SIGNS:  59y , CAPILLARY BLOOD GLUCOSE      POCT Blood Glucose.: 223 mg/dL (14 Dec 2019 09:14)    T(C): 36.8 (19 @ 10:14), Max: 36.8 (19 @ 22:11)  HR: 71 (19 @ 10:14) (64 - 78)  BP: 132/82 (19 @ 10:14) (106/70 - 134/78)  RR: 18 (19 @ 10:14) (18 - 18)  SpO2: 91% (19 @ 10:14) (91% - 96%)  ---___---___---___---___---___---  PHYSICAL EXAM:    GEN: A&O X 3 , NAD , comfortable  HEENT: NCAT, PERRL, MMM, hearing intact  Neck: supple , no JVD  CVS: wheezing b/l improved   ABD.: soft. non tender, non distended,  bowel sounds present  Extrem: intact pulses , no edema   Derm: No rash , no ecchymoses  PSYCH : normal mood,  no delusion not anxious     ---___---___---___---___---___---            LAB AND IMAGIN.4   10.11 )-----------( 240      ( 13 Dec 2019 09:30 )             40.9               12-    135  |  95<L>  |  20  ----------------------------<  277<H>  4.6   |  26  |  0.56    Ca    9.0      13 Dec 2019 06:56                                  [All pertinent / recent Imaging reviewed]         ---___---___---___---___---___---___ ---___---___---___---___---                         A S S E S S M E N T   A N D   P L A N :      HEALTH ISSUES - PROBLEM Dx:     High cholesterol: High cholesterol contineue meds   Anxiety: Anxiety stable on current meds  HTN (hypertension):  HTN (hypertension)  Insulin dependent diabetes mellitus: Insulin dependent diabetes mellitus  Lactic acid increased: Lactic acid increased stable continue meds      Influenza A: Influenza A complete tamiflu course   Asthma exacerbation: Asthma exacerbation continue iv steroids and also continue  nebulizer and peak anne   pulmonary consult input appreciated               -GI/DVT Prophylaxis.    --------------------------------------------  Case discussed with   Education given on   ___________________________  Thank you,  Timothy Leon  [phone]

## 2019-12-15 LAB
GLUCOSE BLDC GLUCOMTR-MCNC: 240 MG/DL — HIGH (ref 70–99)
GLUCOSE BLDC GLUCOMTR-MCNC: 262 MG/DL — HIGH (ref 70–99)
GLUCOSE BLDC GLUCOMTR-MCNC: 386 MG/DL — HIGH (ref 70–99)
GLUCOSE BLDC GLUCOMTR-MCNC: 402 MG/DL — HIGH (ref 70–99)

## 2019-12-15 RX ORDER — INSULIN LISPRO 100/ML
6 VIAL (ML) SUBCUTANEOUS
Refills: 0 | Status: DISCONTINUED | OUTPATIENT
Start: 2019-12-15 | End: 2019-12-16

## 2019-12-15 RX ADMIN — Medication 3 MILLILITER(S): at 06:03

## 2019-12-15 RX ADMIN — Medication 3 MILLILITER(S): at 18:10

## 2019-12-15 RX ADMIN — Medication 1 SPRAY(S): at 06:03

## 2019-12-15 RX ADMIN — Medication 3 MILLILITER(S): at 10:08

## 2019-12-15 RX ADMIN — BUDESONIDE AND FORMOTEROL FUMARATE DIHYDRATE 2 PUFF(S): 160; 4.5 AEROSOL RESPIRATORY (INHALATION) at 06:03

## 2019-12-15 RX ADMIN — Medication 600 MILLIGRAM(S): at 21:56

## 2019-12-15 RX ADMIN — Medication 20 MILLIGRAM(S): at 13:28

## 2019-12-15 RX ADMIN — Medication 1 SPRAY(S): at 18:09

## 2019-12-15 RX ADMIN — INSULIN GLARGINE 10 UNIT(S): 100 INJECTION, SOLUTION SUBCUTANEOUS at 10:07

## 2019-12-15 RX ADMIN — MONTELUKAST 10 MILLIGRAM(S): 4 TABLET, CHEWABLE ORAL at 13:27

## 2019-12-15 RX ADMIN — Medication 20 MILLIGRAM(S): at 06:04

## 2019-12-15 RX ADMIN — OLANZAPINE 10 MILLIGRAM(S): 15 TABLET, FILM COATED ORAL at 21:56

## 2019-12-15 RX ADMIN — Medication 600 MILLIGRAM(S): at 10:08

## 2019-12-15 RX ADMIN — INSULIN GLARGINE 10 UNIT(S): 100 INJECTION, SOLUTION SUBCUTANEOUS at 21:56

## 2019-12-15 RX ADMIN — SIMVASTATIN 40 MILLIGRAM(S): 20 TABLET, FILM COATED ORAL at 21:56

## 2019-12-15 RX ADMIN — Medication 6 UNIT(S): at 18:09

## 2019-12-15 RX ADMIN — Medication 1: at 21:57

## 2019-12-15 RX ADMIN — ENOXAPARIN SODIUM 40 MILLIGRAM(S): 100 INJECTION SUBCUTANEOUS at 13:28

## 2019-12-15 RX ADMIN — ZOLPIDEM TARTRATE 5 MILLIGRAM(S): 10 TABLET ORAL at 21:55

## 2019-12-15 RX ADMIN — Medication 10: at 13:36

## 2019-12-15 RX ADMIN — BUDESONIDE AND FORMOTEROL FUMARATE DIHYDRATE 2 PUFF(S): 160; 4.5 AEROSOL RESPIRATORY (INHALATION) at 18:10

## 2019-12-15 RX ADMIN — Medication 4: at 10:07

## 2019-12-15 RX ADMIN — Medication 100 MILLIGRAM(S): at 21:56

## 2019-12-15 RX ADMIN — Medication 3 MILLILITER(S): at 13:29

## 2019-12-15 RX ADMIN — SERTRALINE 100 MILLIGRAM(S): 25 TABLET, FILM COATED ORAL at 13:27

## 2019-12-15 RX ADMIN — Medication 3 MILLILITER(S): at 21:55

## 2019-12-15 RX ADMIN — CHLORHEXIDINE GLUCONATE 1 APPLICATION(S): 213 SOLUTION TOPICAL at 13:28

## 2019-12-15 RX ADMIN — Medication 12: at 18:08

## 2019-12-15 RX ADMIN — Medication 20 MILLIGRAM(S): at 21:56

## 2019-12-15 RX ADMIN — LOSARTAN POTASSIUM 100 MILLIGRAM(S): 100 TABLET, FILM COATED ORAL at 06:04

## 2019-12-15 NOTE — PROGRESS NOTE ADULT - SUBJECTIVE AND OBJECTIVE BOX
---___---___---___---___---___---___ ---___---___---___---___---___---___---___---___---                  M E D I C A L   A T T E N D I N G   P R O G R E S S   N O T E  ---___---___---___---___---___---___ ---___---___---___---___---___---___---___---___---        ================================================    ++CHIEF COMPLAINT:   Patient is a 59y old  Female who presents with a chief complaint of cough (14 Dec 2019 12:14)      Asthma with acute exacerbation  off droplet precaution   ---___---___---___---___---___---  PAST MEDICAL / Surgical  HISTORY:  PAST MEDICAL & SURGICAL HISTORY:  Depressed  Asthma  HTN (hypertension)  High cholesterol  DM (diabetes mellitus)  No significant past surgical history      ---___---___---___---___---___---  FAMILY HISTORY:   FAMILY HISTORY:        ---___---___---___---___---___---  ALLERGIES:   Allergies    No Known Allergies    Intolerances        ---___---___---___---___---___---  MEDICATIONS:  MEDICATIONS  (STANDING):  albuterol/ipratropium for Nebulization 3 milliLiter(s) Nebulizer every 4 hours  budesonide 160 MICROgram(s)/formoterol 4.5 MICROgram(s) Inhaler 2 Puff(s) Inhalation two times a day  chlorhexidine 4% Liquid 1 Application(s) Topical daily  dextrose 5%. 1000 milliLiter(s) (50 mL/Hr) IV Continuous <Continuous>  dextrose 50% Injectable 12.5 Gram(s) IV Push once  dextrose 50% Injectable 25 Gram(s) IV Push once  dextrose 50% Injectable 25 Gram(s) IV Push once  enoxaparin Injectable 40 milliGRAM(s) SubCutaneous daily  fluticasone propionate 50 MICROgram(s)/spray Nasal Spray 1 Spray(s) Both Nostrils two times a day  guaiFENesin  milliGRAM(s) Oral every 12 hours  insulin glargine Injectable (LANTUS) 10 Unit(s) SubCutaneous two times a day  insulin lispro (HumaLOG) corrective regimen sliding scale   SubCutaneous three times a day before meals  insulin lispro (HumaLOG) corrective regimen sliding scale   SubCutaneous at bedtime  losartan 100 milliGRAM(s) Oral daily  methylPREDNISolone sodium succinate Injectable 20 milliGRAM(s) IV Push every 8 hours  montelukast 10 milliGRAM(s) Oral daily  OLANZapine 10 milliGRAM(s) Oral at bedtime  sertraline 100 milliGRAM(s) Oral daily  simvastatin 40 milliGRAM(s) Oral at bedtime    MEDICATIONS  (PRN):  benzonatate 100 milliGRAM(s) Oral every 8 hours PRN Cough  dextrose 40% Gel 15 Gram(s) Oral once PRN Blood Glucose LESS THAN 70 milliGRAM(s)/deciliter  glucagon  Injectable 1 milliGRAM(s) IntraMuscular once PRN Glucose LESS THAN 70 milligrams/deciliter  zolpidem 5 milliGRAM(s) Oral at bedtime PRN Insomnia      ---___---___---___---___---___---  REVIEW OF SYSTEM:    GEN: no fever, no chills, no pain  RESP: no SOB, no cough, no sputum  CVS: no chest pain, no palpitations, no edema  GI: no abdominal pain, no nausea, no vomiting, no constipation, no diarrhea  : no dysurea, no frequency, no hematurea  Neuro: no headache, no dizziness  PSYCH: no anxiety, no depression  Derm : no itching, no rash    ---___---___---___---___---___---  VITAL SIGNS:  59y , CAPILLARY BLOOD GLUCOSE      POCT Blood Glucose.: 240 mg/dL (15 Dec 2019 10:02)    T(C): 36.8 (12-15-19 @ 10:48), Max: 36.9 (12-14-19 @ 22:00)  HR: 84 (12-15-19 @ 10:48) (70 - 84)  BP: 116/74 (12-15-19 @ 10:48) (112/74 - 133/80)  RR: 18 (12-15-19 @ 10:48) (18 - 20)  SpO2: 96% (12-15-19 @ 10:48) (95% - 96%)  ---___---___---___---___---___---  PHYSICAL EXAM:    GEN: A&O X 3 , NAD , comfortable  HEENT: NCAT, PERRL, MMM, hearing intact  Neck: supple , no JVD  CVS: S1S2 , regular , No M/R/G appreciated  PULM: diffuse wheezing   ABD.: soft. non tender, non distended,  bowel sounds present  Extrem: intact pulses , no edema   Derm: No rash , no ecchymoses  PSYCH : normal mood,  no delusion not anxious     ---___---___---___---___---___---            LAB AND IMAGING:                                                  [All pertinent / recent Imaging reviewed]         ---___---___---___---___---___---___ ---___---___---___---___---                         A S S E S S M E N T   A N D   P L A N :      HEALTH ISSUES - PROBLEM Dx:     High cholesterol: High cholesterol  continue meds  Anxiety: Anxiety continue current care   HTN (hypertension): HTN (hypertension)  Insulin dependent diabetes mellitus: Insulin dependent diabetes mellitus on insulin      Influenza A: Influenza A finished tamiflu  Asthma exacerbation: Asthma exacerbation continue iv steroids and awaiting pulmonary recommendation in am   duoneb as well                 -GI/DVT Prophylaxis.    --------------------------------------------  Case discussed with   Education given on   ___________________________  Thank you,  Timothy Leon  [phone]

## 2019-12-15 NOTE — CHART NOTE - NSCHARTNOTEFT_GEN_A_CORE
S= hyperglycemia   HPI:  60 y/o F with PMH of anxiety/insomnia, HTN, HLD, asthma (s/p hx of MICU/intubation years ago), type 2 DM, presents with coughing and wheezing x 1 week. Reports worsening productive cough with yellow/clear sputum and wheezing despite using her home regimen for the past 3 days. Saw her PCP 1 week ago and was giving prednisone and unknown antibiotic 1 week ago but has not help. Denies f/c, n/v, abd pain, headache, lightheadedness.  Denies any sob, maria, cp, back pain, LE pain/swelling, recent travel, sick contacts, clots      POCT Blood Glucose.: 386 mg/dL (15 Dec 2019 13:23)  POCT Blood Glucose.: 240 mg/dL (15 Dec 2019 10:02)  POCT Blood Glucose.: 336 mg/dL (14 Dec 2019 21:17)  POCT Blood Glucose.: 291 mg/dL (14 Dec 2019 17:50)    MEDICATIONS  (STANDING):  albuterol/ipratropium for Nebulization 3 milliLiter(s) Nebulizer every 4 hours  budesonide 160 MICROgram(s)/formoterol 4.5 MICROgram(s) Inhaler 2 Puff(s) Inhalation two times a day  chlorhexidine 4% Liquid 1 Application(s) Topical daily  dextrose 5%. 1000 milliLiter(s) (50 mL/Hr) IV Continuous <Continuous>  dextrose 50% Injectable 12.5 Gram(s) IV Push once  dextrose 50% Injectable 25 Gram(s) IV Push once  dextrose 50% Injectable 25 Gram(s) IV Push once  enoxaparin Injectable 40 milliGRAM(s) SubCutaneous daily  fluticasone propionate 50 MICROgram(s)/spray Nasal Spray 1 Spray(s) Both Nostrils two times a day  guaiFENesin  milliGRAM(s) Oral every 12 hours  insulin glargine Injectable (LANTUS) 10 Unit(s) SubCutaneous two times a day  insulin lispro (HumaLOG) corrective regimen sliding scale   SubCutaneous three times a day before meals  insulin lispro (HumaLOG) corrective regimen sliding scale   SubCutaneous at bedtime  insulin lispro Injectable (HumaLOG) 6 Unit(s) SubCutaneous three times a day before meals  losartan 100 milliGRAM(s) Oral daily  methylPREDNISolone sodium succinate Injectable 20 milliGRAM(s) IV Push every 8 hours  montelukast 10 milliGRAM(s) Oral daily  OLANZapine 10 milliGRAM(s) Oral at bedtime  sertraline 100 milliGRAM(s) Oral daily  simvastatin 40 milliGRAM(s) Oral at bedtime  a/p 59 yrs old female with asthma exacerbation , on iv steroids  now with hyperglycemia  Start pre meals to control persistent hyperglycemia secondary to steroids

## 2019-12-16 DIAGNOSIS — E11.9 TYPE 2 DIABETES MELLITUS WITHOUT COMPLICATIONS: ICD-10-CM

## 2019-12-16 DIAGNOSIS — E78.00 PURE HYPERCHOLESTEROLEMIA, UNSPECIFIED: ICD-10-CM

## 2019-12-16 LAB
GLUCOSE BLDC GLUCOMTR-MCNC: 224 MG/DL — HIGH (ref 70–99)
GLUCOSE BLDC GLUCOMTR-MCNC: 229 MG/DL — HIGH (ref 70–99)
GLUCOSE BLDC GLUCOMTR-MCNC: 255 MG/DL — HIGH (ref 70–99)
GLUCOSE BLDC GLUCOMTR-MCNC: 322 MG/DL — HIGH (ref 70–99)

## 2019-12-16 PROCEDURE — 99233 SBSQ HOSP IP/OBS HIGH 50: CPT

## 2019-12-16 RX ORDER — INSULIN LISPRO 100/ML
8 VIAL (ML) SUBCUTANEOUS
Refills: 0 | Status: DISCONTINUED | OUTPATIENT
Start: 2019-12-16 | End: 2019-12-17

## 2019-12-16 RX ORDER — INSULIN GLARGINE 100 [IU]/ML
15 INJECTION, SOLUTION SUBCUTANEOUS
Refills: 0 | Status: DISCONTINUED | OUTPATIENT
Start: 2019-12-16 | End: 2019-12-17

## 2019-12-16 RX ADMIN — Medication 4: at 18:46

## 2019-12-16 RX ADMIN — Medication 6 UNIT(S): at 13:47

## 2019-12-16 RX ADMIN — INSULIN GLARGINE 15 UNIT(S): 100 INJECTION, SOLUTION SUBCUTANEOUS at 22:03

## 2019-12-16 RX ADMIN — MONTELUKAST 10 MILLIGRAM(S): 4 TABLET, CHEWABLE ORAL at 11:59

## 2019-12-16 RX ADMIN — BUDESONIDE AND FORMOTEROL FUMARATE DIHYDRATE 2 PUFF(S): 160; 4.5 AEROSOL RESPIRATORY (INHALATION) at 06:03

## 2019-12-16 RX ADMIN — Medication 3 MILLILITER(S): at 21:17

## 2019-12-16 RX ADMIN — Medication 8 UNIT(S): at 18:47

## 2019-12-16 RX ADMIN — CHLORHEXIDINE GLUCONATE 1 APPLICATION(S): 213 SOLUTION TOPICAL at 11:31

## 2019-12-16 RX ADMIN — OLANZAPINE 10 MILLIGRAM(S): 15 TABLET, FILM COATED ORAL at 21:17

## 2019-12-16 RX ADMIN — Medication 600 MILLIGRAM(S): at 21:17

## 2019-12-16 RX ADMIN — INSULIN GLARGINE 10 UNIT(S): 100 INJECTION, SOLUTION SUBCUTANEOUS at 09:24

## 2019-12-16 RX ADMIN — Medication 20 MILLIGRAM(S): at 06:04

## 2019-12-16 RX ADMIN — Medication 3 MILLILITER(S): at 09:27

## 2019-12-16 RX ADMIN — Medication 20 MILLIGRAM(S): at 17:44

## 2019-12-16 RX ADMIN — Medication 600 MILLIGRAM(S): at 09:27

## 2019-12-16 RX ADMIN — Medication 6 UNIT(S): at 09:25

## 2019-12-16 RX ADMIN — Medication 8: at 13:47

## 2019-12-16 RX ADMIN — ENOXAPARIN SODIUM 40 MILLIGRAM(S): 100 INJECTION SUBCUTANEOUS at 11:33

## 2019-12-16 RX ADMIN — Medication 3 MILLILITER(S): at 06:03

## 2019-12-16 RX ADMIN — ZOLPIDEM TARTRATE 5 MILLIGRAM(S): 10 TABLET ORAL at 23:23

## 2019-12-16 RX ADMIN — Medication 100 MILLIGRAM(S): at 21:17

## 2019-12-16 RX ADMIN — LOSARTAN POTASSIUM 100 MILLIGRAM(S): 100 TABLET, FILM COATED ORAL at 06:04

## 2019-12-16 RX ADMIN — BUDESONIDE AND FORMOTEROL FUMARATE DIHYDRATE 2 PUFF(S): 160; 4.5 AEROSOL RESPIRATORY (INHALATION) at 17:42

## 2019-12-16 RX ADMIN — SIMVASTATIN 40 MILLIGRAM(S): 20 TABLET, FILM COATED ORAL at 21:17

## 2019-12-16 RX ADMIN — Medication 3 MILLILITER(S): at 13:48

## 2019-12-16 RX ADMIN — Medication 1 SPRAY(S): at 17:43

## 2019-12-16 RX ADMIN — Medication 3 MILLILITER(S): at 17:43

## 2019-12-16 RX ADMIN — SERTRALINE 100 MILLIGRAM(S): 25 TABLET, FILM COATED ORAL at 11:31

## 2019-12-16 RX ADMIN — Medication 1 SPRAY(S): at 06:03

## 2019-12-16 NOTE — PROGRESS NOTE ADULT - SUBJECTIVE AND OBJECTIVE BOX
[facility] - Division of Pulmonary, Critical Care and Sleep Medicine   Please call [phone] between 8am-pm weekdays, [phone] after hours and weekends    Interval Events:    REVIEW OF SYSTEMS:  CV: [ ] chest pain   Resp: [ ] cough [ ] shortness of breath   [ ] All other systems negative  [ ] Unable to assess ROS because ________    OBJECTIVE:  ICU Vital Signs Last 24 Hrs  T(C): 36.4 (16 Dec 2019 10:28), Max: 36.9 (16 Dec 2019 06:08)  T(F): 97.6 (16 Dec 2019 10:28), Max: 98.4 (16 Dec 2019 06:08)  HR: 90 (16 Dec 2019 10:28) (75 - 90)  BP: 144/85 (16 Dec 2019 10:28) (119/76 - 144/85)  BP(mean): --  ABP: --  ABP(mean): --  RR: 18 (16 Dec 2019 10:28) (18 - 18)  SpO2: 93% (16 Dec 2019 10:28) (93% - 95%)        12-15 @ 07:01  -  12-16 @ 07:00  --------------------------------------------------------  IN: 1310 mL / OUT: 0 mL / NET: 1310 mL      CAPILLARY BLOOD GLUCOSE      POCT Blood Glucose.: 255 mg/dL (16 Dec 2019 09:23)      PHYSICAL EXAM:  General: NAD  HEENT: NC/AT  Lymph Nodes: no cervical or supraclavicular lymphadenopathy  Neck: supple  Respiratory:  CTA b/l, no wheezes, crackles or rhonchi  Cardiovascular:  RRR, no m/r/g  Abdomen: soft, NT/ND, +BS  Extremities: no clubbing, cyanosis or edema, warm  Skin: no rash  Neurological: AAOx3, non focal exam  Psychiatry: not anxious appearing, normal affect and mood    HOSPITAL MEDICATIONS:  MEDICATIONS  (STANDING):  albuterol/ipratropium for Nebulization 3 milliLiter(s) Nebulizer every 4 hours  budesonide 160 MICROgram(s)/formoterol 4.5 MICROgram(s) Inhaler 2 Puff(s) Inhalation two times a day  chlorhexidine 4% Liquid 1 Application(s) Topical daily  dextrose 5%. 1000 milliLiter(s) (50 mL/Hr) IV Continuous <Continuous>  dextrose 50% Injectable 12.5 Gram(s) IV Push once  dextrose 50% Injectable 25 Gram(s) IV Push once  dextrose 50% Injectable 25 Gram(s) IV Push once  enoxaparin Injectable 40 milliGRAM(s) SubCutaneous daily  fluticasone propionate 50 MICROgram(s)/spray Nasal Spray 1 Spray(s) Both Nostrils two times a day  guaiFENesin  milliGRAM(s) Oral every 12 hours  insulin glargine Injectable (LANTUS) 10 Unit(s) SubCutaneous two times a day  insulin lispro (HumaLOG) corrective regimen sliding scale   SubCutaneous three times a day before meals  insulin lispro (HumaLOG) corrective regimen sliding scale   SubCutaneous at bedtime  insulin lispro Injectable (HumaLOG) 6 Unit(s) SubCutaneous three times a day before meals  losartan 100 milliGRAM(s) Oral daily  methylPREDNISolone sodium succinate Injectable 20 milliGRAM(s) IV Push every 8 hours  montelukast 10 milliGRAM(s) Oral daily  OLANZapine 10 milliGRAM(s) Oral at bedtime  sertraline 100 milliGRAM(s) Oral daily  simvastatin 40 milliGRAM(s) Oral at bedtime    MEDICATIONS  (PRN):  benzonatate 100 milliGRAM(s) Oral every 8 hours PRN Cough  dextrose 40% Gel 15 Gram(s) Oral once PRN Blood Glucose LESS THAN 70 milliGRAM(s)/deciliter  glucagon  Injectable 1 milliGRAM(s) IntraMuscular once PRN Glucose LESS THAN 70 milligrams/deciliter  zolpidem 5 milliGRAM(s) Oral at bedtime PRN Insomnia      LABS:  Hgb Trend: 13.4<--, 13.9<--, 12.8<--  Creatinine Trend: 0.56<--, 0.60<--, 0.64<--, 0.49<--, 0.64<--    MICROBIOLOGY:       RADIOLOGY:  [x ] Reviewed and interpreted by me  < from: Xray Chest 2 Views PA/Lat (12.10.19 @ 04:12) >    EXAM:  XR CHEST PA LAT 2V                          PROCEDURE DATE:  12/10/2019      INTERPRETATION:  CLINICAL INDICATION: cough    TECHNIQUE: 2 views of the chest was obtained.    COMPARISON: Chest radiograph 3/1/2016.    FINDINGS:     Lungs are clear. No pleural effusion or pneumothorax.   Cardiac size is normal. No acute osseous finding.    IMPRESSION:   Clear lungs    DAVID ALMODOVAR M.D., RADIOLOGY RESIDENT  This document has been electronically signed.  ISABELLA PAYNE M.D., ATTENDING RADIOLOGIST  This document has been electronically signed. Dec 10 2019  9:49AM    < end of copied text > [facility] - Division of Pulmonary, Critical Care and Sleep Medicine   Please call [phone] between 8am-pm weekdays, [phone] after hours and weekends    Interval Events: No events overnight -  wheezing and chest tightness slowly improving. Ambulating around the medical floor - denies significant dyspnea.     REVIEW OF SYSTEMS:  CV: [- ] chest pain   Resp: [ +] cough [- ] shortness of breath   [x ] All other systems negative  [ ] Unable to assess ROS because ________    OBJECTIVE:  ICU Vital Signs Last 24 Hrs  T(C): 36.4 (16 Dec 2019 10:28), Max: 36.9 (16 Dec 2019 06:08)  T(F): 97.6 (16 Dec 2019 10:28), Max: 98.4 (16 Dec 2019 06:08)  HR: 90 (16 Dec 2019 10:28) (75 - 90)  BP: 144/85 (16 Dec 2019 10:28) (119/76 - 144/85)  BP(mean): --  ABP: --  ABP(mean): --  RR: 18 (16 Dec 2019 10:28) (18 - 18)  SpO2: 93% (16 Dec 2019 10:28) (93% - 95%)        12-15 @ 07:01  -  12-16 @ 07:00  --------------------------------------------------------  IN: 1310 mL / OUT: 0 mL / NET: 1310 mL      CAPILLARY BLOOD GLUCOSE      POCT Blood Glucose.: 255 mg/dL (16 Dec 2019 09:23)      PHYSICAL EXAM:  General: NAD  HEENT: NC/AT  Neck: supple  Respiratory:  CTA b/l, scattered expiratory wheezes, no crackles or rhonchi  Cardiovascular:  RRR, no m/r/g  Abdomen: soft, NT/ND, +BS  Extremities: no clubbing, cyanosis or edema, warm  Skin: no rash  Neurological: AAOx3, non focal exam  Psychiatry: not anxious appearing, normal affect and mood    HOSPITAL MEDICATIONS:  MEDICATIONS  (STANDING):  albuterol/ipratropium for Nebulization 3 milliLiter(s) Nebulizer every 4 hours  budesonide 160 MICROgram(s)/formoterol 4.5 MICROgram(s) Inhaler 2 Puff(s) Inhalation two times a day  chlorhexidine 4% Liquid 1 Application(s) Topical daily  dextrose 5%. 1000 milliLiter(s) (50 mL/Hr) IV Continuous <Continuous>  dextrose 50% Injectable 12.5 Gram(s) IV Push once  dextrose 50% Injectable 25 Gram(s) IV Push once  dextrose 50% Injectable 25 Gram(s) IV Push once  enoxaparin Injectable 40 milliGRAM(s) SubCutaneous daily  fluticasone propionate 50 MICROgram(s)/spray Nasal Spray 1 Spray(s) Both Nostrils two times a day  guaiFENesin  milliGRAM(s) Oral every 12 hours  insulin glargine Injectable (LANTUS) 10 Unit(s) SubCutaneous two times a day  insulin lispro (HumaLOG) corrective regimen sliding scale   SubCutaneous three times a day before meals  insulin lispro (HumaLOG) corrective regimen sliding scale   SubCutaneous at bedtime  insulin lispro Injectable (HumaLOG) 6 Unit(s) SubCutaneous three times a day before meals  losartan 100 milliGRAM(s) Oral daily  methylPREDNISolone sodium succinate Injectable 20 milliGRAM(s) IV Push every 8 hours  montelukast 10 milliGRAM(s) Oral daily  OLANZapine 10 milliGRAM(s) Oral at bedtime  sertraline 100 milliGRAM(s) Oral daily  simvastatin 40 milliGRAM(s) Oral at bedtime    MEDICATIONS  (PRN):  benzonatate 100 milliGRAM(s) Oral every 8 hours PRN Cough  dextrose 40% Gel 15 Gram(s) Oral once PRN Blood Glucose LESS THAN 70 milliGRAM(s)/deciliter  glucagon  Injectable 1 milliGRAM(s) IntraMuscular once PRN Glucose LESS THAN 70 milligrams/deciliter  zolpidem 5 milliGRAM(s) Oral at bedtime PRN Insomnia      LABS:  Hgb Trend: 13.4<--, 13.9<--, 12.8<--  Creatinine Trend: 0.56<--, 0.60<--, 0.64<--, 0.49<--, 0.64<--    MICROBIOLOGY:       RADIOLOGY:  [x ] Reviewed and interpreted by me  < from: Xray Chest 2 Views PA/Lat (12.10.19 @ 04:12) >    EXAM:  XR CHEST PA LAT 2V                          PROCEDURE DATE:  12/10/2019      INTERPRETATION:  CLINICAL INDICATION: cough    TECHNIQUE: 2 views of the chest was obtained.    COMPARISON: Chest radiograph 3/1/2016.    FINDINGS:     Lungs are clear. No pleural effusion or pneumothorax.   Cardiac size is normal. No acute osseous finding.    IMPRESSION:   Clear lungs    DAVID ALMODOVAR M.D., RADIOLOGY RESIDENT  This document has been electronically signed.  ISABELLA PAYNE M.D., ATTENDING RADIOLOGIST  This document has been electronically signed. Dec 10 2019  9:49AM    < end of copied text >

## 2019-12-16 NOTE — PROGRESS NOTE ADULT - SUBJECTIVE AND OBJECTIVE BOX
---___---___---___---___---___---___ ---___---___---___---___---___---___---___---___---                  M E D I C A L   A T T E N D I N G   P R O G R E S S   N O T E  ---___---___---___---___---___---___ ---___---___---___---___---___---___---___---___---        ================================================    ++CHIEF COMPLAINT:   Patient is a 59y old  Female who presents with a chief complaint of cough (16 Dec 2019 11:09)      Asthma with acute exacerbation wheezing improving     ---___---___---___---___---___---  PAST MEDICAL / Surgical  HISTORY:  PAST MEDICAL & SURGICAL HISTORY:  Depressed  Asthma  HTN (hypertension)  High cholesterol  DM (diabetes mellitus)  No significant past surgical history      ---___---___---___---___---___---  FAMILY HISTORY:   FAMILY HISTORY:        ---___---___---___---___---___---  ALLERGIES:   Allergies    No Known Allergies    Intolerances        ---___---___---___---___---___---  MEDICATIONS:  MEDICATIONS  (STANDING):  albuterol/ipratropium for Nebulization 3 milliLiter(s) Nebulizer every 4 hours  budesonide 160 MICROgram(s)/formoterol 4.5 MICROgram(s) Inhaler 2 Puff(s) Inhalation two times a day  chlorhexidine 4% Liquid 1 Application(s) Topical daily  dextrose 5%. 1000 milliLiter(s) (50 mL/Hr) IV Continuous <Continuous>  dextrose 50% Injectable 12.5 Gram(s) IV Push once  dextrose 50% Injectable 25 Gram(s) IV Push once  dextrose 50% Injectable 25 Gram(s) IV Push once  enoxaparin Injectable 40 milliGRAM(s) SubCutaneous daily  fluticasone propionate 50 MICROgram(s)/spray Nasal Spray 1 Spray(s) Both Nostrils two times a day  guaiFENesin  milliGRAM(s) Oral every 12 hours  insulin glargine Injectable (LANTUS) 15 Unit(s) SubCutaneous two times a day  insulin lispro (HumaLOG) corrective regimen sliding scale   SubCutaneous three times a day before meals  insulin lispro (HumaLOG) corrective regimen sliding scale   SubCutaneous at bedtime  insulin lispro Injectable (HumaLOG) 8 Unit(s) SubCutaneous three times a day before meals  losartan 100 milliGRAM(s) Oral daily  methylPREDNISolone sodium succinate Injectable 20 milliGRAM(s) IV Push every 12 hours  montelukast 10 milliGRAM(s) Oral daily  OLANZapine 10 milliGRAM(s) Oral at bedtime  sertraline 100 milliGRAM(s) Oral daily  simvastatin 40 milliGRAM(s) Oral at bedtime    MEDICATIONS  (PRN):  benzonatate 100 milliGRAM(s) Oral every 8 hours PRN Cough  dextrose 40% Gel 15 Gram(s) Oral once PRN Blood Glucose LESS THAN 70 milliGRAM(s)/deciliter  glucagon  Injectable 1 milliGRAM(s) IntraMuscular once PRN Glucose LESS THAN 70 milligrams/deciliter  zolpidem 5 milliGRAM(s) Oral at bedtime PRN Insomnia      ---___---___---___---___---___---  REVIEW OF SYSTEM:    GEN: no fever, no chills, no pain  RESP: no SOB, no cough, no sputum  CVS: no chest pain, no palpitations, no edema  GI: no abdominal pain, no nausea, no vomiting, no constipation, no diarrhea  : no dysurea, no frequency, no hematurea  Neuro: no headache, no dizziness  PSYCH: no anxiety, no depression  Derm : no itching, no rash    ---___---___---___---___---___---  VITAL SIGNS:  59y , CAPILLARY BLOOD GLUCOSE      POCT Blood Glucose.: 229 mg/dL (16 Dec 2019 21:29)    T(C): 36.6 (12-16-19 @ 20:19), Max: 36.9 (12-16-19 @ 06:08)  HR: 82 (12-16-19 @ 20:19) (76 - 90)  BP: 118/74 (12-16-19 @ 20:19) (118/74 - 144/85)  RR: 18 (12-16-19 @ 20:19) (18 - 18)  SpO2: 92% (12-16-19 @ 20:19) (92% - 95%)  ---___---___---___---___---___---  PHYSICAL EXAM:    GEN: A&O X 3 , NAD , comfortable  HEENT: NCAT, PERRL, MMM, hearing intact  Neck: supple , no JVD  CVS: S1S2 , regular , No M/R/G appreciated  PULM: wheezing noted  ABD.: soft. non tender, non distended,  bowel sounds present  Extrem: intact pulses , no edema   Derm: No rash , no ecchymoses  PSYCH : normal mood,  no delusion not anxious     ---___---___---___---___---___---            LAB AND IMAGING:                                                  [All pertinent / recent Imaging reviewed]         ---___---___---___---___---___---___ ---___---___---___---___---                         A S S E S S M E N T   A N D   P L A N :      HEALTH ISSUES - PROBLEM Dx:  Insulin dependent diabetes mellitus continue insulin therapy  High cholesterol continue lipitor  High cholesterol:  continue meds    Anxiety: stable  HTN (hypertension): HTN (hypertension) stable  Insulin dependent diabetes mellitus: Your next appointment with endocrinologist ([facility] endocrine ph: [phone])/PMD is -   HgA1C this admission -  Make sure you get your HgA1c checked every three months.    Lactic acid increased: resolved  Influenza A: completed tamiflu for 5 days  Asthma exacerbation: Asthma exacerbation continue iv steroids and taper to oral asper  pulmonary                -GI/DVT Prophylaxis.    --------------------------------------------  Case discussed with   Education given on   ___________________________  Thank you,  Timothy Leon  [phone]

## 2019-12-16 NOTE — PROGRESS NOTE ADULT - ASSESSMENT
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 Severe eosinophilic asthma with acute exacerbation/ Influenza A infection   - outpatient evaluation for biologic therapy  - Continue Solumedrol 20mg would reduce to Q12h  - Once resp status stabilizes, can transition back to Prednisone dose to 40 mg daily and taper slowly over 10-14 days  - Please provide her with a peak flow meter and monitor peak flow rate BID  - cont Duonebs Q6H standing  - Cont Symbicort BID   - Cont Flonase BID for post nasal drip   - Completed Azithro and Tamiflu x 5 days. CXR clear, no evidence of PNA.   - check sputum culture   - Tessalon perle 100 mg Q8h standing for cough   - Incentive spirometry, acapella Q6h to assist with expectoration  - Keep O2 sat > 90 %  - Incentive spirometry     2. Influenza A infection  - has had symptoms for some time but now appears to be improving on current therapy  - Continue Tamiflu to complete course    3. Gen:  - DVT PX: Lovenox SQ  - Maintain O2 sat > 90%  - OOB and ambulation - check ambulatory O2 saturation  - Outpt pulm FUP with Dr. Pereira 59 F never smoker with a PMH of obesity, HTN, HLD, DM type 2, severe eosinophilic asthma ( h/o intubation and multiple admissions),  HUDSON ( supposed to be on CPAP 7 cm H20), now presenting with an asthma exacerbation 2/2 Influenza A infection    1. Severe eosinophilic asthma with acute exacerbation/ Influenza A infection   - outpatient evaluation for biologic therapy  - Continue Solumedrol 20mg would reduce to Q12h and likely switch to Prednisone 40 mg QD with slow taper over 7-10 days  - Monitor peak flow rate BID  - cont Duonebs Q6H standing  - Cont Symbicort BID   - Cont Flonase BID for post nasal drip   - Completed Azithro and Tamiflu x 5 days. CXR clear, no evidence of PNA.   - Tessalon perle 100 mg Q8h standing for cough   - Incentive spirometry, acapella Q6h to assist with expectoration  - Keep O2 sat > 90 %  - Incentive spirometry     2. Influenza A infection  - s/p Tamiflu     3. Gen:  From a pulmonary standpoint, anticipate discharge home in next 24-48 hours with prednisone taper  - DVT PX: Lovenox SQ  - OOB and ambulation  Outpatient pulm FUP with Dr. Pereira   Will follow up

## 2019-12-17 ENCOUNTER — APPOINTMENT (OUTPATIENT)
Dept: PULMONOLOGY | Facility: CLINIC | Age: 59
End: 2019-12-17

## 2019-12-17 ENCOUNTER — TRANSCRIPTION ENCOUNTER (OUTPATIENT)
Age: 59
End: 2019-12-17

## 2019-12-17 VITALS
OXYGEN SATURATION: 95 % | DIASTOLIC BLOOD PRESSURE: 75 MMHG | RESPIRATION RATE: 18 BRPM | HEART RATE: 80 BPM | SYSTOLIC BLOOD PRESSURE: 114 MMHG | TEMPERATURE: 98 F

## 2019-12-17 LAB
ANION GAP SERPL CALC-SCNC: 16 MMOL/L — SIGNIFICANT CHANGE UP (ref 5–17)
BUN SERPL-MCNC: 25 MG/DL — HIGH (ref 7–23)
CALCIUM SERPL-MCNC: 8.8 MG/DL — SIGNIFICANT CHANGE UP (ref 8.4–10.5)
CHLORIDE SERPL-SCNC: 91 MMOL/L — LOW (ref 96–108)
CO2 SERPL-SCNC: 24 MMOL/L — SIGNIFICANT CHANGE UP (ref 22–31)
CREAT SERPL-MCNC: 0.55 MG/DL — SIGNIFICANT CHANGE UP (ref 0.5–1.3)
GLUCOSE BLDC GLUCOMTR-MCNC: 184 MG/DL — HIGH (ref 70–99)
GLUCOSE BLDC GLUCOMTR-MCNC: 238 MG/DL — HIGH (ref 70–99)
GLUCOSE BLDC GLUCOMTR-MCNC: 352 MG/DL — HIGH (ref 70–99)
GLUCOSE SERPL-MCNC: 247 MG/DL — HIGH (ref 70–99)
HCT VFR BLD CALC: 40.7 % — SIGNIFICANT CHANGE UP (ref 34.5–45)
HGB BLD-MCNC: 13.4 G/DL — SIGNIFICANT CHANGE UP (ref 11.5–15.5)
MCHC RBC-ENTMCNC: 27.6 PG — SIGNIFICANT CHANGE UP (ref 27–34)
MCHC RBC-ENTMCNC: 32.9 GM/DL — SIGNIFICANT CHANGE UP (ref 32–36)
MCV RBC AUTO: 83.9 FL — SIGNIFICANT CHANGE UP (ref 80–100)
PLATELET # BLD AUTO: 241 K/UL — SIGNIFICANT CHANGE UP (ref 150–400)
POTASSIUM SERPL-MCNC: 3.5 MMOL/L — SIGNIFICANT CHANGE UP (ref 3.5–5.3)
POTASSIUM SERPL-SCNC: 3.5 MMOL/L — SIGNIFICANT CHANGE UP (ref 3.5–5.3)
RBC # BLD: 4.85 M/UL — SIGNIFICANT CHANGE UP (ref 3.8–5.2)
RBC # FLD: 15.4 % — HIGH (ref 10.3–14.5)
SODIUM SERPL-SCNC: 131 MMOL/L — LOW (ref 135–145)
WBC # BLD: 12.08 K/UL — HIGH (ref 3.8–10.5)
WBC # FLD AUTO: 12.08 K/UL — HIGH (ref 3.8–10.5)

## 2019-12-17 PROCEDURE — 85027 COMPLETE CBC AUTOMATED: CPT

## 2019-12-17 PROCEDURE — 82435 ASSAY OF BLOOD CHLORIDE: CPT

## 2019-12-17 PROCEDURE — 83605 ASSAY OF LACTIC ACID: CPT

## 2019-12-17 PROCEDURE — 85014 HEMATOCRIT: CPT

## 2019-12-17 PROCEDURE — 82962 GLUCOSE BLOOD TEST: CPT

## 2019-12-17 PROCEDURE — 87633 RESP VIRUS 12-25 TARGETS: CPT

## 2019-12-17 PROCEDURE — 94640 AIRWAY INHALATION TREATMENT: CPT

## 2019-12-17 PROCEDURE — 87486 CHLMYD PNEUM DNA AMP PROBE: CPT

## 2019-12-17 PROCEDURE — 80053 COMPREHEN METABOLIC PANEL: CPT

## 2019-12-17 PROCEDURE — 87581 M.PNEUMON DNA AMP PROBE: CPT

## 2019-12-17 PROCEDURE — 71046 X-RAY EXAM CHEST 2 VIEWS: CPT

## 2019-12-17 PROCEDURE — 82803 BLOOD GASES ANY COMBINATION: CPT

## 2019-12-17 PROCEDURE — 99285 EMERGENCY DEPT VISIT HI MDM: CPT | Mod: 25

## 2019-12-17 PROCEDURE — 80048 BASIC METABOLIC PNL TOTAL CA: CPT

## 2019-12-17 PROCEDURE — 82947 ASSAY GLUCOSE BLOOD QUANT: CPT

## 2019-12-17 PROCEDURE — 87798 DETECT AGENT NOS DNA AMP: CPT

## 2019-12-17 PROCEDURE — 84132 ASSAY OF SERUM POTASSIUM: CPT

## 2019-12-17 PROCEDURE — 87631 RESP VIRUS 3-5 TARGETS: CPT

## 2019-12-17 PROCEDURE — 82010 KETONE BODYS QUAN: CPT

## 2019-12-17 PROCEDURE — 82330 ASSAY OF CALCIUM: CPT

## 2019-12-17 PROCEDURE — 83036 HEMOGLOBIN GLYCOSYLATED A1C: CPT

## 2019-12-17 PROCEDURE — 84295 ASSAY OF SERUM SODIUM: CPT

## 2019-12-17 PROCEDURE — 99233 SBSQ HOSP IP/OBS HIGH 50: CPT

## 2019-12-17 PROCEDURE — 86803 HEPATITIS C AB TEST: CPT

## 2019-12-17 RX ORDER — FLUTICASONE PROPIONATE 50 MCG
1 SPRAY, SUSPENSION NASAL
Qty: 0 | Refills: 0 | DISCHARGE
Start: 2019-12-17

## 2019-12-17 RX ORDER — ENOXAPARIN SODIUM 100 MG/ML
20 INJECTION SUBCUTANEOUS
Qty: 0 | Refills: 0 | DISCHARGE

## 2019-12-17 RX ORDER — IPRATROPIUM/ALBUTEROL SULFATE 18-103MCG
1 AEROSOL WITH ADAPTER (GRAM) INHALATION
Qty: 30 | Refills: 0
Start: 2019-12-17

## 2019-12-17 RX ORDER — ENOXAPARIN SODIUM 100 MG/ML
15 INJECTION SUBCUTANEOUS
Qty: 0 | Refills: 0 | DISCHARGE

## 2019-12-17 RX ORDER — FLUTICASONE PROPIONATE 50 MCG
1 SPRAY, SUSPENSION NASAL
Qty: 1 | Refills: 0
Start: 2019-12-17

## 2019-12-17 RX ADMIN — ENOXAPARIN SODIUM 40 MILLIGRAM(S): 100 INJECTION SUBCUTANEOUS at 13:01

## 2019-12-17 RX ADMIN — Medication 20 MILLIGRAM(S): at 06:36

## 2019-12-17 RX ADMIN — LOSARTAN POTASSIUM 100 MILLIGRAM(S): 100 TABLET, FILM COATED ORAL at 06:36

## 2019-12-17 RX ADMIN — INSULIN GLARGINE 15 UNIT(S): 100 INJECTION, SOLUTION SUBCUTANEOUS at 09:25

## 2019-12-17 RX ADMIN — SERTRALINE 100 MILLIGRAM(S): 25 TABLET, FILM COATED ORAL at 13:02

## 2019-12-17 RX ADMIN — Medication 3 MILLILITER(S): at 17:15

## 2019-12-17 RX ADMIN — Medication 20 MILLIGRAM(S): at 17:16

## 2019-12-17 RX ADMIN — BUDESONIDE AND FORMOTEROL FUMARATE DIHYDRATE 2 PUFF(S): 160; 4.5 AEROSOL RESPIRATORY (INHALATION) at 17:14

## 2019-12-17 RX ADMIN — Medication 8 UNIT(S): at 13:35

## 2019-12-17 RX ADMIN — Medication 8 UNIT(S): at 18:05

## 2019-12-17 RX ADMIN — BUDESONIDE AND FORMOTEROL FUMARATE DIHYDRATE 2 PUFF(S): 160; 4.5 AEROSOL RESPIRATORY (INHALATION) at 06:37

## 2019-12-17 RX ADMIN — Medication 3 MILLILITER(S): at 06:36

## 2019-12-17 RX ADMIN — Medication 600 MILLIGRAM(S): at 09:27

## 2019-12-17 RX ADMIN — Medication 8 UNIT(S): at 09:26

## 2019-12-17 RX ADMIN — Medication 4: at 18:05

## 2019-12-17 RX ADMIN — Medication 3 MILLILITER(S): at 09:27

## 2019-12-17 RX ADMIN — Medication 1 SPRAY(S): at 17:15

## 2019-12-17 RX ADMIN — Medication 2: at 09:26

## 2019-12-17 RX ADMIN — Medication 10: at 13:34

## 2019-12-17 RX ADMIN — Medication 1 SPRAY(S): at 06:37

## 2019-12-17 RX ADMIN — Medication 3 MILLILITER(S): at 13:02

## 2019-12-17 RX ADMIN — CHLORHEXIDINE GLUCONATE 1 APPLICATION(S): 213 SOLUTION TOPICAL at 13:01

## 2019-12-17 RX ADMIN — MONTELUKAST 10 MILLIGRAM(S): 4 TABLET, CHEWABLE ORAL at 13:01

## 2019-12-17 NOTE — PROGRESS NOTE ADULT - SUBJECTIVE AND OBJECTIVE BOX
[facility] - Division of Pulmonary, Critical Care and Sleep Medicine   Please call [phone] between 8am-pm weekdays, [phone] after hours and weekends    Interval Events: No events overnights, states she is feeling better and wants to go home. Ambulating without dyspnea, baseline wheeze    REVIEW OF SYSTEMS:  CV: [- ] chest pain   Resp: [- ] cough [- ] shortness of breath   [x ] All other systems negative  [ ] Unable to assess ROS because ________    OBJECTIVE:  ICU Vital Signs Last 24 Hrs  T(C): 36.4 (17 Dec 2019 08:05), Max: 36.9 (16 Dec 2019 14:32)  T(F): 97.6 (17 Dec 2019 08:05), Max: 98.4 (16 Dec 2019 14:32)  HR: 92 (17 Dec 2019 09:39) (80 - 92)  BP: 106/71 (17 Dec 2019 08:05) (106/71 - 134/81)  BP(mean): --  ABP: --  ABP(mean): --  RR: 18 (17 Dec 2019 08:05) (18 - 18)  SpO2: 97% (17 Dec 2019 09:39) (90% - 97%)        12-16 @ 07:01  -  12-17 @ 07:00  --------------------------------------------------------  IN: 600 mL / OUT: 0 mL / NET: 600 mL    12-17 @ 07:01  -  12-17 @ 12:18  --------------------------------------------------------  IN: 240 mL / OUT: 0 mL / NET: 240 mL      CAPILLARY BLOOD GLUCOSE      POCT Blood Glucose.: 184 mg/dL (17 Dec 2019 09:17)      PHYSICAL EXAM:  General: NAD  HEENT: NC/AT  Neck: supple  Respiratory:  CTA b/l, scattered exp wheezes, no crackles or rhonchi  Cardiovascular:  RRR, no m/r/g  Abdomen: soft, NT/ND, +BS  Extremities: no clubbing, cyanosis or edema, warm  Skin: no rash  Neurological: AAOx3, non focal exam  Psychiatry: not anxious appearing, normal affect and mood    HOSPITAL MEDICATIONS:  MEDICATIONS  (STANDING):  albuterol/ipratropium for Nebulization 3 milliLiter(s) Nebulizer every 4 hours  budesonide 160 MICROgram(s)/formoterol 4.5 MICROgram(s) Inhaler 2 Puff(s) Inhalation two times a day  chlorhexidine 4% Liquid 1 Application(s) Topical daily  dextrose 5%. 1000 milliLiter(s) (50 mL/Hr) IV Continuous <Continuous>  dextrose 50% Injectable 12.5 Gram(s) IV Push once  dextrose 50% Injectable 25 Gram(s) IV Push once  dextrose 50% Injectable 25 Gram(s) IV Push once  enoxaparin Injectable 40 milliGRAM(s) SubCutaneous daily  fluticasone propionate 50 MICROgram(s)/spray Nasal Spray 1 Spray(s) Both Nostrils two times a day  guaiFENesin  milliGRAM(s) Oral every 12 hours  insulin glargine Injectable (LANTUS) 15 Unit(s) SubCutaneous two times a day  insulin lispro (HumaLOG) corrective regimen sliding scale   SubCutaneous three times a day before meals  insulin lispro (HumaLOG) corrective regimen sliding scale   SubCutaneous at bedtime  insulin lispro Injectable (HumaLOG) 8 Unit(s) SubCutaneous three times a day before meals  losartan 100 milliGRAM(s) Oral daily  methylPREDNISolone sodium succinate Injectable 20 milliGRAM(s) IV Push every 12 hours  montelukast 10 milliGRAM(s) Oral daily  OLANZapine 10 milliGRAM(s) Oral at bedtime  sertraline 100 milliGRAM(s) Oral daily  simvastatin 40 milliGRAM(s) Oral at bedtime    MEDICATIONS  (PRN):  benzonatate 100 milliGRAM(s) Oral every 8 hours PRN Cough  dextrose 40% Gel 15 Gram(s) Oral once PRN Blood Glucose LESS THAN 70 milliGRAM(s)/deciliter  glucagon  Injectable 1 milliGRAM(s) IntraMuscular once PRN Glucose LESS THAN 70 milligrams/deciliter  zolpidem 5 milliGRAM(s) Oral at bedtime PRN Insomnia      LABS:                        13.4   12.08 )-----------( 241      ( 17 Dec 2019 09:13 )             40.7     Hgb Trend: 13.4<--, 13.4<--, 13.9<--  12-17    131<L>  |  91<L>  |  25<H>  ----------------------------<  247<H>  3.5   |  24  |  0.55    Ca    8.8      17 Dec 2019 06:59      Creatinine Trend: 0.55<--, 0.56<--, 0.60<--, 0.64<--, 0.49<--, 0.64<--            MICROBIOLOGY:     RADIOLOGY:  [ x] Reviewed and interpreted by me

## 2019-12-17 NOTE — PROGRESS NOTE ADULT - ASSESSMENT
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 Severe eosinophilic asthma with acute exacerbation/ Influenza A infection   - outpatient evaluation for biologic therapy  - Switch to Prednisone 40 mg QD today with slow taper over 7-10 days  - Monitor peak flow rate BID  - cont Duonebs Q6H standing  - Cont Symbicort BID   - Cont Flonase BID for post nasal drip   - Completed Azithro and Tamiflu x 5 days. CXR clear, no evidence of PNA.   - Tessalon perle 100 mg Q8h standing for cough   - Incentive spirometry, acapella Q6h to assist with expectoration  - Keep O2 sat > 90 %  - Incentive spirometry     2. Influenza A infection  - s/p Tamiflu     3. Gen:  From a pulmonary standpoint, she is much improved and can be discharged home later today with prednisone taper- will require prescriptions for steroids, inhalers, tessalon perles.  - DVT PX: Lovenox SQ  - OOB and ambulation  Outpatient pulm FUP with Dr. Pereira in the next 7 days.         Attending Attestation:   I was physically present for the key portions of the evaluation and management (E/M) service provided.  I agree with the above history, physical, and plan which I have reviewed and edited where appropriate.     35 minutes spent on total encounter; more than 50% of the visit was spent counseling and/or coordinating care by the attending physician.     Plan discussed with patient and NP.

## 2019-12-17 NOTE — DISCHARGE NOTE PROVIDER - NSDCMRMEDTOKEN_GEN_ALL_CORE_FT
Lantus Solostar Pen 100 units/mL subcutaneous solution: 20 unit(s) subcutaneous once a day (at bedtime)  Lantus Solostar Pen 100 units/mL subcutaneous solution: 15 unit(s) subcutaneous once a day  in the morning  losartan 100 mg oral tablet: 1 tab(s) orally once a day  metFORMIN 500 mg oral tablet, extended release: 1 tab(s) orally once a day  OLANZapine 10 mg oral tablet: 1 tab(s) orally once a day (at bedtime)  sertraline 100 mg oral tablet: 1 tab(s) orally once a day  simvastatin 40 mg oral tablet: 1 tab(s) orally once a day (at bedtime)  Singulair 10 mg oral tablet: 1 tab(s) orally once a day  Symbicort 160 mcg-4.5 mcg/inh inhalation aerosol: 2 puff(s) inhaled 2 times a day  Ventolin HFA 90 mcg/inh inhalation aerosol: 2 puff(s) inhaled 4 times a day, As Needed  zolpidem 5 mg oral tablet: 1 tab(s) orally once a day (at bedtime), As Needed benzonatate 100 mg oral capsule: 1 cap(s) orally every 8 hours, As needed, Cough  fluticasone 50 mcg/inh nasal spray: 1 spray(s) nasal 2 times a day  guaiFENesin 600 mg oral tablet, extended release: 1 tab(s) orally every 12 hours  ipratropium-albuterol 0.5 mg-2.5 mg/3 mLinhalation solution: 1 dose(s) inhaled every 6 hours, As Needed   Lantus Solostar Pen 100 units/mL subcutaneous solution: 20 unit(s) subcutaneous once a day (at bedtime)  Lantus Solostar Pen 100 units/mL subcutaneous solution: 15 unit(s) subcutaneous once a day  in the morning  losartan 100 mg oral tablet: 1 tab(s) orally once a day  metFORMIN 500 mg oral tablet, extended release: 1 tab(s) orally once a day  OLANZapine 10 mg oral tablet: 1 tab(s) orally once a day (at bedtime)  predniSONE 10 mg oral tablet: 4 tab(s) orally once a dayx 3 days  then 3 tab daily x 3 days           2 tab daily x 3 days            1 tab daily x 3 days  sertraline 100 mg oral tablet: 1 tab(s) orally once a day  simvastatin 40 mg oral tablet: 1 tab(s) orally once a day (at bedtime)  Singulair 10 mg oral tablet: 1 tab(s) orally once a day  Symbicort 160 mcg-4.5 mcg/inh inhalation aerosol: 2 puff(s) inhaled 2 times a day  Ventolin HFA 90 mcg/inh inhalation aerosol: 2 puff(s) inhaled 4 times a day, As Needed  zolpidem 5 mg oral tablet: 1 tab(s) orally once a day (at bedtime), As Needed

## 2019-12-17 NOTE — DISCHARGE NOTE PROVIDER - NSDCFUSCHEDAPPT_GEN_ALL_CORE_FT
SHEREE HDZ ; 12/27/2019 ; [facility] [address] [place] [address] SHEREE HDZ ; 12/24/2019 ; NPP PulmMed [address]  SHEREE HDZ ; 12/27/2019 ; [facility] [address] [place] [address]

## 2019-12-17 NOTE — DISCHARGE NOTE NURSING/CASE MANAGEMENT/SOCIAL WORK - PATIENT PORTAL LINK FT
You can access the FollowMyHealth Patient Portal offered by [facility] by registering at the following website: [email or web address]. By joining [facility]’s FollowMyHealth portal, you will also be able to view your health information using other applications (apps) compatible with our system.

## 2019-12-17 NOTE — PROGRESS NOTE ADULT - SUBJECTIVE AND OBJECTIVE BOX
---___---___---___---___---___---___ ---___---___---___---___---___---___---___---___---                  M E D I C A L   A T T E N D I N G   P R O G R E S S   N O T E  ---___---___---___---___---___---___ ---___---___---___---___---___---___---___---___---        ================================================    ++CHIEF COMPLAINT:   Patient is a 59y old  Female who presents with a chief complaint of cough (17 Dec 2019 15:31)  patient was seen prior to discharge today doing better and cleared by pulmonary    ---___---___---___---___---___---  PAST MEDICAL / Surgical  HISTORY:  PAST MEDICAL & SURGICAL HISTORY:  Depressed  Asthma  HTN (hypertension)  High cholesterol  DM (diabetes mellitus)  No significant past surgical history      ---___---___---___---___---___---  FAMILY HISTORY:   FAMILY HISTORY:        ---___---___---___---___---___---  ALLERGIES:   Allergies    No Known Allergies    Intolerances        ---___---___---___---___---___---  MEDICATIONS:  MEDICATIONS  (STANDING):  albuterol/ipratropium for Nebulization 3 milliLiter(s) Nebulizer every 4 hours  budesonide 160 MICROgram(s)/formoterol 4.5 MICROgram(s) Inhaler 2 Puff(s) Inhalation two times a day  chlorhexidine 4% Liquid 1 Application(s) Topical daily  dextrose 5%. 1000 milliLiter(s) (50 mL/Hr) IV Continuous <Continuous>  dextrose 50% Injectable 12.5 Gram(s) IV Push once  dextrose 50% Injectable 25 Gram(s) IV Push once  dextrose 50% Injectable 25 Gram(s) IV Push once  enoxaparin Injectable 40 milliGRAM(s) SubCutaneous daily  fluticasone propionate 50 MICROgram(s)/spray Nasal Spray 1 Spray(s) Both Nostrils two times a day  guaiFENesin  milliGRAM(s) Oral every 12 hours  insulin glargine Injectable (LANTUS) 15 Unit(s) SubCutaneous two times a day  insulin lispro (HumaLOG) corrective regimen sliding scale   SubCutaneous three times a day before meals  insulin lispro (HumaLOG) corrective regimen sliding scale   SubCutaneous at bedtime  insulin lispro Injectable (HumaLOG) 8 Unit(s) SubCutaneous three times a day before meals  losartan 100 milliGRAM(s) Oral daily  methylPREDNISolone sodium succinate Injectable 20 milliGRAM(s) IV Push every 12 hours  montelukast 10 milliGRAM(s) Oral daily  OLANZapine 10 milliGRAM(s) Oral at bedtime  sertraline 100 milliGRAM(s) Oral daily  simvastatin 40 milliGRAM(s) Oral at bedtime    MEDICATIONS  (PRN):  benzonatate 100 milliGRAM(s) Oral every 8 hours PRN Cough  dextrose 40% Gel 15 Gram(s) Oral once PRN Blood Glucose LESS THAN 70 milliGRAM(s)/deciliter  glucagon  Injectable 1 milliGRAM(s) IntraMuscular once PRN Glucose LESS THAN 70 milligrams/deciliter  zolpidem 5 milliGRAM(s) Oral at bedtime PRN Insomnia      ---___---___---___---___---___---  REVIEW OF SYSTEM:    GEN: no fever, no chills, no pain  RESP: no SOB, no cough, no sputum  CVS: no chest pain, no palpitations, no edema  GI: no abdominal pain, no nausea, no vomiting, no constipation, no diarrhea  : no dysurea, no frequency, no hematurea  Neuro: no headache, no dizziness  PSYCH: no anxiety, no depression  Derm : no itching, no rash    ---___---___---___---___---___---  VITAL SIGNS:  59y , CAPILLARY BLOOD GLUCOSE      POCT Blood Glucose.: 238 mg/dL (17 Dec 2019 17:49)    T(C): 36.7 (19 @ 13:36), Max: 36.7 (19 @ 23:18)  HR: 80 (19 @ 13:36) (80 - 92)  BP: 114/75 (19 @ 13:36) (106/71 - 134/81)  RR: 18 (19 @ 13:36) (18 - 18)  SpO2: 95% (19 @ 13:36) (90% - 97%)  ---___---___---___---___---___---  PHYSICAL EXAM:    GEN: A&O X 3 , NAD , comfortable  HEENT: NCAT, PERRL, MMM, hearing intact  Neck: supple , no JVD  CVS: S1S2 , regular , No M/R/G appreciated  PULM: CTA B/L,  no W/R/R appreciated  ABD.: soft. non tender, non distended,  bowel sounds present  Extrem: intact pulses , no edema   Derm: No rash , no ecchymoses  PSYCH : normal mood,  no delusion not anxious     ---___---___---___---___---___---            LAB AND IMAGIN.4   12.08 )-----------( 241      ( 17 Dec 2019 09:13 )             40.7               12-    131<L>  |  91<L>  |  25<H>  ----------------------------<  247<H>  3.5   |  24  |  0.55    Ca    8.8      17 Dec 2019 06:59                                  [All pertinent / recent Imaging reviewed]         ---___---___---___---___---___---___ ---___---___---___---___---                         ELI S S E S S M E N T   A N D   P L A N :      HEALTH ISSUES - PROBLEM Dx:  Insulin dependent diabetes mellitus  High cholesterol continue meds  Need for prophylactic measure: Need for prophylactic measure  High cholesterol: Coronary artery disease is a condition where the arteries the supply the heart muscle get clogges with fatty deposits &amp; puts you at risk for a heart   Make sure to keep appointments with doctor for cardiac follow up care    asthma continue care as per pulmonary reccomendation  on oral tapered steroid   follow up with pcp and pulmonary   dm2  on insulin increased dosage for short while  until off of steroids    dc home today          -GI/DVT Prophylaxis.    --------------------------------------------  Case discussed with   Education given on   ___________________________  Thank you,  Timothy Leon  [phone]

## 2019-12-17 NOTE — DISCHARGE NOTE NURSING/CASE MANAGEMENT/SOCIAL WORK - NSCORESITESY/N_GEN_A_CORE_RD
No
Cornelio Courtney), Urology  [address] [facility]  [address] [place], [place] [address]  Phone: [phone]  Fax: [phone]    Olivia Roberts), Medicine  [address] [address]  Phone: [phone]  Fax: [phone]    Harish Campbell), Medicine  [address] [address]  Phone: [phone]  Fax: [phone]

## 2019-12-17 NOTE — DISCHARGE NOTE PROVIDER - HOSPITAL COURSE
59 F never smoker with a PMH of obesity, HTN, HLD, DM type 2, severe eosinophilic asthma ( h/o intubation and multiple admissions),  HUDSON ( supposed to be on CPAP 7 cm H20), now presenting with an asthma exacerbation 2/2 Influenza A infection. Treated with IV steroid and nebulizers    Patient improved a lot. Cleared form pulmonary stand point. Blood sugar was high due to    steroid therapy, insulin was increased to home regimen and placed on premeals    Patient going home on oral steroid slow tapering. Will increase PM lantus to 20    and back on metformin as per Dr Leon. Patient o f/u with his PCP/endo in 2 days    and pulmonary in 1 week.        1. Severe eosinophilic asthma with acute exacerbation/ Influenza A infection     - outpatient evaluation for biologic therapy    - Switch to Prednisone 40 mg QD today with slow taper over 7-10 days    - Monitor peak flow rate BID    - cont Duonebs Q6H standing    - Cont Symbicort BID     - Cont Flonase BID for post nasal drip     - Completed Azithro and Tamiflu x 5 days. CXR clear, no evidence of PNA.     - Tessalon perle 100 mg Q8h standing for cough     - Incentive spirometry, acapella Q6h to assist with expectoration    - Keep O2 sat > 90 %    - Incentive spirometry         2. Influenza A infection    - s/p Tamiflu         3. Gen:    From a pulmonary standpoint, she is much improved and can be discharged home later today with prednisone taper- will require prescriptions for steroids, inhalers, tessalon perles.    - DVT PX: Lovenox SQ    - OOB and ambulation    Outpatient pulm FUP with Dr. Pereira in the next 7 days.                 Attending Attestation:     I was physically present for the key portions of the evaluation and management (E/M) service provided.  I agree with the above history, physical, and plan which I have reviewed and edited where appropriate.         35 minutes spent on total encounter; more than 50% of the visit was spent counseling and/or coordinating care by the attending physician.         Plan discussed with patient and NP.                Electronic Signatures:    Rea Levin)  (Signed 17-Dec-2019 12:25)    	Authored: Progress Note, Reason for Admission, Subjective and Objective, Assessment and Plan            Last Updated: 17-Dec-2019 12:25 by Rea Levin) 59 F never smoker with a PMH of obesity, HTN, HLD, DM type 2, severe eosinophilic asthma ( h/o intubation and multiple admissions),  HUDSON ( supposed to be on CPAP 7 cm H20), now presenting with an asthma exacerbation 2/2 Influenza A infection. Treated with IV steroid and nebulizers    Patient improved a lot. Cleared form pulmonary stand point. Blood sugar was high due to    steroid therapy, insulin was increased to home regimen and placed on premeals    Patient going home on oral steroid slow tapering. Will increase PM lantus to 20    and back on metformin as per Dr Leon. Patient o f/u with his PCP/endo in 2 days    and pulmonary in 1 week.

## 2019-12-17 NOTE — DISCHARGE NOTE PROVIDER - NSDCCPCAREPLAN_GEN_ALL_CORE_FT
PRINCIPAL DISCHARGE DIAGNOSIS  Diagnosis: Asthma exacerbation  Assessment and Plan of Treatment: Asthma attacks happen when the airways in the lungs become narrow and inflamed  Asthma symptoms can include - Wheezing or noisy breathing, coughing, tight feeling in the chest, shortness of breath  Almost everyone with asthma has a quick-relief inhaler that works in 5- 10mins that they carry with them and long-term controller medicines control asthma and prevent future symptoms. People with frequent asthma symptoms take these 1 or 2 times each day.  You can stay away from things that cause your symptoms or make them worse. Doctors call these "triggers."  avoid them as much as possible. Some common triggers include - Dust, mold, animals, such as dogs and cats, pollen and plants, cigarette smoke, getting sick with a cold or flu (that's why it's important to get a flu shot), stress  Talk to your caregiver about an action plan for managing asthma attacks. This includes the use of a peak flow meter which measures the severity of the attack and medicines that can help stop the attack.   SEEK MEDICAL CARE IF:  You have wheezing, shortness of breath, or a cough even if taking medicine to prevent attacks.   You have thickening of sputum, sputum changes from clear or white to yellow, green, gray, or bloody.   You have any problems that may be related to the medicines you are taking (such as a rash, itching, swelling, or trouble breathing).  You are using a reliever medicine more than 2–3 times per week.  Your peak flow is still at 50–79% of personal best after following your action plan for 1 hour.  SEEK IMMEDIATE MEDICAL CARE IF:  You are short of breath even at rest,or with very little physical activity, chest pain, heart beating fast, bluish color to your lips or fingernails, fever, dizziness,   You seem to be getting worse and are unresponsive to treatment during an asthma attack.   Your peak flow is less than 50% of personal best.        SECONDARY DISCHARGE DIAGNOSES  Diagnosis: Influenza A  Assessment and Plan of Treatment: completed tamiflu for 5 days    Diagnosis: Insulin dependent diabetes mellitus  Assessment and Plan of Treatment:   HgA1C this admission -  Make sure you get your HgA1c checked every three months.  If you take oral diabetes medications, check your blood glucose two times a day.  If you take insulin, check your blood glucose before meals and at bedtime.  It's important not to skip any meals.  Keep a log of your blood glucose results and always take it with you to your doctor appointments.  Keep a list of your current medications including injectables and over the counter medications and bring this medication list with you to all your doctor appointments.  If you have not seen your ophthalmologist this year call for appointment.  Check your feet daily for redness, sores, or openings. Do not self treat. If no improvement in two days call your primary care physician for an appointment.  Low blood sugar (hypoglycemia) is a blood sugar below 70mg/dl. Check your blood sugar if you feel signs/symptoms of hypoglycemia. If your blood sugar is below 70 take 15 grams of carbohydrates (ex 4 oz of apple juice, 3-4 glucose tablets, or 4-6 oz of regular soda) wait 15 minutes and repeat blood sugar to make sure it comes up above 70.  If your blood sugar is above 70 and you are due for a meal, have a meal.  If you are not due for a meal have a snack.  This snack helps keeps your blood sugar at a safe range.  Your night lantus has been increased to 20 at night. Needs to go back on previous regimen once prednisone has been tapered off  F/U with your PCP/Endocrine in 2 days PRINCIPAL DISCHARGE DIAGNOSIS  Diagnosis: Asthma exacerbation  Assessment and Plan of Treatment: Asthma attacks happen when the airways in the lungs become narrow and inflamed  Asthma symptoms can include - Wheezing or noisy breathing, coughing, tight feeling in the chest, shortness of breath  Almost everyone with asthma has a quick-relief inhaler that works in 5- 10mins that they carry with them and long-term controller medicines control asthma and prevent future symptoms. People with frequent asthma symptoms take these 1 or 2 times each day.  You can stay away from things that cause your symptoms or make them worse. Doctors call these "triggers."  avoid them as much as possible. Some common triggers include - Dust, mold, animals, such as dogs and cats, pollen and plants, cigarette smoke, getting sick with a cold or flu (that's why it's important to get a flu shot), stress  Talk to your caregiver about an action plan for managing asthma attacks. This includes the use of a peak flow meter which measures the severity of the attack and medicines that can help stop the attack.   SEEK MEDICAL CARE IF:  You have wheezing, shortness of breath, or a cough even if taking medicine to prevent attacks.   You have thickening of sputum, sputum changes from clear or white to yellow, green, gray, or bloody.   You have any problems that may be related to the medicines you are taking (such as a rash, itching, swelling, or trouble breathing).  You are using a reliever medicine more than 2–3 times per week.  Your peak flow is still at 50–79% of personal best after following your action plan for 1 hour.  SEEK IMMEDIATE MEDICAL CARE IF:  You are short of breath even at rest,or with very little physical activity, chest pain, heart beating fast, bluish color to your lips or fingernails, fever, dizziness,   You seem to be getting worse and are unresponsive to treatment during an asthma attack.   Your peak flow is less than 50% of personal best.  F/u with Dr Pereira in 1 week        SECONDARY DISCHARGE DIAGNOSES  Diagnosis: Influenza A  Assessment and Plan of Treatment: completed tamiflu for 5 days    Diagnosis: Insulin dependent diabetes mellitus  Assessment and Plan of Treatment:   HgA1C this admission -  Make sure you get your HgA1c checked every three months.  If you take oral diabetes medications, check your blood glucose two times a day.  If you take insulin, check your blood glucose before meals and at bedtime.  It's important not to skip any meals.  Keep a log of your blood glucose results and always take it with you to your doctor appointments.  Keep a list of your current medications including injectables and over the counter medications and bring this medication list with you to all your doctor appointments.  If you have not seen your ophthalmologist this year call for appointment.  Check your feet daily for redness, sores, or openings. Do not self treat. If no improvement in two days call your primary care physician for an appointment.  Low blood sugar (hypoglycemia) is a blood sugar below 70mg/dl. Check your blood sugar if you feel signs/symptoms of hypoglycemia. If your blood sugar is below 70 take 15 grams of carbohydrates (ex 4 oz of apple juice, 3-4 glucose tablets, or 4-6 oz of regular soda) wait 15 minutes and repeat blood sugar to make sure it comes up above 70.  If your blood sugar is above 70 and you are due for a meal, have a meal.  If you are not due for a meal have a snack.  This snack helps keeps your blood sugar at a safe range.  Your night lantus has been increased to 20 at night. Needs to go back on previous regimen once prednisone has been tapered off  F/U with your PCP/Endocrine in 2 days      Diagnosis: HTN (hypertension)  Assessment and Plan of Treatment: Low salt diet  Activity as tolerated.  Take all medication as prescribed.  Follow up with your medical doctor for routine blood pressure monitoring at your next visit.  Notify your doctor if you have any of the following symptoms:   Dizziness, Lightheadedness, Blurry vision, Headache, Chest pain, Shortness of breath

## 2019-12-17 NOTE — DISCHARGE NOTE PROVIDER - CARE PROVIDER_API CALL
Rolando Barnard)  Internal Medicine  [address]  [place], [place] [id number]  Phone: [phone]  Fax: [phone]  Follow Up Time:

## 2019-12-19 ENCOUNTER — APPOINTMENT (OUTPATIENT)
Dept: PULMONOLOGY | Facility: CLINIC | Age: 59
End: 2019-12-19
Payer: MEDICAID

## 2019-12-19 VITALS
DIASTOLIC BLOOD PRESSURE: 80 MMHG | WEIGHT: 170 LBS | RESPIRATION RATE: 16 BRPM | BODY MASS INDEX: 35.68 KG/M2 | HEIGHT: 58 IN | SYSTOLIC BLOOD PRESSURE: 130 MMHG | HEART RATE: 97 BPM | OXYGEN SATURATION: 96 %

## 2019-12-19 DIAGNOSIS — J45.909 UNSPECIFIED ASTHMA, UNCOMPLICATED: ICD-10-CM

## 2019-12-19 PROCEDURE — 99214 OFFICE O/P EST MOD 30 MIN: CPT | Mod: 25

## 2019-12-19 PROCEDURE — 96372 THER/PROPH/DIAG INJ SC/IM: CPT

## 2019-12-19 RX ORDER — MEPOLIZUMAB 100 MG/ML
100 INJECTION, POWDER, FOR SOLUTION SUBCUTANEOUS
Qty: 0 | Refills: 0 | Status: COMPLETED | OUTPATIENT
Start: 2019-12-19

## 2019-12-19 RX ADMIN — MEPOLIZUMAB 1 MG: 100 INJECTION, POWDER, FOR SOLUTION SUBCUTANEOUS at 00:00

## 2019-12-19 NOTE — PHYSICAL EXAM
[General Appearance - Well Developed] : well developed [General Appearance - Well Nourished] : well nourished [General Appearance - In No Acute Distress] : no acute distress [Normal Conjunctiva] : the conjunctiva exhibited no abnormalities [Erythema] : erythema of the pharynx [III] : III [] : the neck was supple [Heart Rate And Rhythm] : heart rate and rhythm were normal [Jugular Venous Distention Increased] : there was no jugular-venous distention [Heart Sounds] : normal S1 and S2 [Auscultation Breath Sounds / Voice Sounds] : lungs were clear to auscultation bilaterally [Respiration, Rhythm And Depth] : normal respiratory rhythm and effort [Abdomen Soft] : soft [Abdomen Tenderness] : non-tender [Abnormal Walk] : normal gait [Nail Clubbing] : no clubbing of the fingernails [Cyanosis, Localized] : no localized cyanosis [Non-Pitting] : non-pitting [Skin Color & Pigmentation] : normal skin color and pigmentation [Cranial Nerves] : cranial nerves 2-12 were intact [No Focal Deficits] : no focal deficits [Oriented To Time, Place, And Person] : oriented to person, place, and time

## 2019-12-27 ENCOUNTER — OUTPATIENT (OUTPATIENT)
Dept: OUTPATIENT SERVICES | Facility: HOSPITAL | Age: 59
LOS: 1 days | Discharge: HOME | End: 2019-12-27

## 2019-12-27 ENCOUNTER — APPOINTMENT (OUTPATIENT)
Dept: OTOLARYNGOLOGY | Facility: CLINIC | Age: 59
End: 2019-12-27
Payer: MEDICAID

## 2019-12-27 VITALS
DIASTOLIC BLOOD PRESSURE: 72 MMHG | HEIGHT: 61 IN | HEART RATE: 98 BPM | WEIGHT: 173 LBS | BODY MASS INDEX: 32.66 KG/M2 | SYSTOLIC BLOOD PRESSURE: 117 MMHG

## 2019-12-27 DIAGNOSIS — H90.5 UNSPECIFIED SENSORINEURAL HEARING LOSS: ICD-10-CM

## 2019-12-27 DIAGNOSIS — H93.292 OTHER ABNORMAL AUDITORY PERCEPTIONS, LEFT EAR: ICD-10-CM

## 2019-12-27 DIAGNOSIS — H61.22 IMPACTED CERUMEN, LEFT EAR: ICD-10-CM

## 2019-12-27 PROCEDURE — G0268 REMOVAL OF IMPACTED WAX MD: CPT

## 2019-12-27 PROCEDURE — 92567 TYMPANOMETRY: CPT

## 2019-12-27 PROCEDURE — 92557 COMPREHENSIVE HEARING TEST: CPT

## 2019-12-27 PROCEDURE — 99203 OFFICE O/P NEW LOW 30 MIN: CPT | Mod: 25

## 2019-12-27 RX ORDER — PREDNISONE 20 MG/1
20 TABLET ORAL DAILY
Qty: 20 | Refills: 2 | Status: DISCONTINUED | COMMUNITY
Start: 2019-12-03 | End: 2019-12-27

## 2019-12-27 RX ORDER — CEFUROXIME AXETIL 500 MG/1
500 TABLET ORAL
Qty: 14 | Refills: 0 | Status: DISCONTINUED | COMMUNITY
Start: 2019-12-03 | End: 2019-12-27

## 2019-12-27 RX ORDER — PREDNISONE 20 MG/1
20 TABLET ORAL DAILY
Qty: 14 | Refills: 0 | Status: DISCONTINUED | COMMUNITY
Start: 2019-04-23 | End: 2019-12-27

## 2019-12-27 RX ORDER — GLYBURIDE AND METFORMIN HYDROCHLORIDE 5; 500 MG/1; MG/1
5-500 TABLET, FILM COATED ORAL
Qty: 60 | Refills: 0 | Status: DISCONTINUED | COMMUNITY
Start: 2018-02-19 | End: 2019-12-27

## 2019-12-27 RX ORDER — OLANZAPINE 2.5 MG/1
2.5 TABLET, FILM COATED ORAL
Qty: 30 | Refills: 0 | Status: DISCONTINUED | COMMUNITY
Start: 2017-12-05 | End: 2019-12-27

## 2019-12-27 RX ORDER — LEVOFLOXACIN 500 MG/1
500 TABLET, FILM COATED ORAL DAILY
Qty: 5 | Refills: 0 | Status: DISCONTINUED | COMMUNITY
Start: 2019-04-23 | End: 2019-12-27

## 2019-12-27 RX ORDER — IBUPROFEN 600 MG/1
600 TABLET, FILM COATED ORAL 3 TIMES DAILY
Qty: 30 | Refills: 0 | Status: DISCONTINUED | COMMUNITY
Start: 2019-04-23 | End: 2019-12-27

## 2019-12-27 RX ORDER — AZELASTINE HYDROCHLORIDE AND FLUTICASONE PROPIONATE 137; 50 UG/1; UG/1
137-50 SPRAY, METERED NASAL
Qty: 1 | Refills: 1 | Status: DISCONTINUED | COMMUNITY
Start: 2019-03-29 | End: 2019-12-27

## 2019-12-27 RX ORDER — PREDNISONE 20 MG/1
20 TABLET ORAL DAILY
Qty: 20 | Refills: 0 | Status: DISCONTINUED | COMMUNITY
Start: 2019-03-29 | End: 2019-12-27

## 2019-12-27 RX ORDER — OLANZAPINE 5 MG/1
5 TABLET, FILM COATED ORAL
Qty: 30 | Refills: 0 | Status: DISCONTINUED | COMMUNITY
Start: 2017-12-02 | End: 2019-12-27

## 2019-12-27 RX ORDER — SUMATRIPTAN 50 MG/1
50 TABLET, FILM COATED ORAL
Qty: 12 | Refills: 0 | Status: DISCONTINUED | COMMUNITY
Start: 2018-02-22 | End: 2019-12-27

## 2019-12-27 RX ORDER — BROMPHENIRAMINE MALEATE, PSEUDOEPHEDRINE HYDROCHLORIDE, 2; 30; 10 MG/5ML; MG/5ML; MG/5ML
30-2-10 SYRUP ORAL TWICE DAILY
Qty: 100 | Refills: 1 | Status: DISCONTINUED | COMMUNITY
Start: 2019-12-03 | End: 2019-12-27

## 2019-12-27 NOTE — PROCEDURE
[FreeTextEntry3] : Procedure note:  Left cerumenectomy\par \par Dec 27, 2019 \par \par Description of Procedure:  Cerumen impaction was noted requiring debridement under the operating microscope using otologic instrumentation.  The patient tolerated the procedure without complications.\par \par

## 2019-12-27 NOTE — HISTORY OF PRESENT ILLNESS
[de-identified] : 59 year old male presents for left hearing loss for the past year, gradual in onset. Reports hearing loss is constant, non fluctuating. Reports left ear feels clogged. Denies otalgia, otorrhea, tinnitus, dizziness, vertigo, ear infections, ear surgeries. Denies family history of hearing loss. Denies head trauma.

## 2019-12-27 NOTE — DATA REVIEWED
[de-identified] : I personally reviewed the patient's audiogram, which shows left low frequency hearing loss with apparent bone threshold depression, type A tymps b/l.\par

## 2019-12-27 NOTE — PHYSICAL EXAM
[de-identified] : impacted cerumen left ear [Normal] : orientation to person, place, and time: normal

## 2020-01-10 ENCOUNTER — APPOINTMENT (OUTPATIENT)
Dept: PULMONOLOGY | Facility: CLINIC | Age: 60
End: 2020-01-10
Payer: MEDICAID

## 2020-01-10 VITALS
HEART RATE: 92 BPM | WEIGHT: 173 LBS | TEMPERATURE: 98.4 F | BODY MASS INDEX: 32.66 KG/M2 | RESPIRATION RATE: 16 BRPM | OXYGEN SATURATION: 96 % | DIASTOLIC BLOOD PRESSURE: 80 MMHG | SYSTOLIC BLOOD PRESSURE: 138 MMHG | HEIGHT: 61 IN

## 2020-01-10 DIAGNOSIS — Z87.09 PERSONAL HISTORY OF OTHER DISEASES OF THE RESPIRATORY SYSTEM: ICD-10-CM

## 2020-01-10 DIAGNOSIS — J45.901 UNSPECIFIED ASTHMA WITH (ACUTE) EXACERBATION: ICD-10-CM

## 2020-01-10 PROCEDURE — 99214 OFFICE O/P EST MOD 30 MIN: CPT | Mod: 25

## 2020-01-10 PROCEDURE — 96372 THER/PROPH/DIAG INJ SC/IM: CPT

## 2020-01-10 RX ORDER — BENZONATATE 200 MG/1
200 CAPSULE ORAL 3 TIMES DAILY
Qty: 60 | Refills: 1 | Status: ACTIVE | COMMUNITY
Start: 2020-01-10 | End: 1900-01-01

## 2020-01-10 RX ORDER — LEVOFLOXACIN 500 MG/1
500 TABLET, FILM COATED ORAL DAILY
Qty: 7 | Refills: 1 | Status: ACTIVE | COMMUNITY
Start: 2020-01-10

## 2020-01-10 RX ORDER — METHYLPRED ACET/NACL,ISO-OS/PF 40 MG/ML
40 VIAL (ML) INJECTION
Qty: 1 | Refills: 0 | Status: COMPLETED | OUTPATIENT
Start: 2020-01-10

## 2020-01-10 RX ADMIN — METHYLPREDNISOLONE ACETATE 0 MG/ML: 40 INJECTION, SUSPENSION INTRA-ARTICULAR; INTRALESIONAL; INTRAMUSCULAR; SOFT TISSUE at 00:00

## 2020-01-10 NOTE — PHYSICAL EXAM
[General Appearance - Well Developed] : well developed [Normal Conjunctiva] : the conjunctiva exhibited no abnormalities [General Appearance - In No Acute Distress] : no acute distress [General Appearance - Well Nourished] : well nourished [III] : III [Erythema] : erythema of the pharynx [] : the neck was supple [Jugular Venous Distention Increased] : there was no jugular-venous distention [Heart Sounds] : normal S1 and S2 [Respiration, Rhythm And Depth] : normal respiratory rhythm and effort [Heart Rate And Rhythm] : heart rate and rhythm were normal [Abdomen Soft] : soft [Abdomen Tenderness] : non-tender [Nail Clubbing] : no clubbing of the fingernails [Abnormal Walk] : normal gait [Cyanosis, Localized] : no localized cyanosis [Cranial Nerves] : cranial nerves 2-12 were intact [Skin Color & Pigmentation] : normal skin color and pigmentation [Non-Pitting] : non-pitting [No Focal Deficits] : no focal deficits [Oriented To Time, Place, And Person] : oriented to person, place, and time [FreeTextEntry1] : Scattered expiratory wheeze in all lung fields

## 2020-01-10 NOTE — ASSESSMENT
[FreeTextEntry1] : 59 year old female presents with Hx severe persistent eosinophilic asthma, recently diagnosed with HUDSON presents for increased symptoms cough, shortness of breath, now with acute URI, asthma exacerbation\par \par Continue Advair 250/50 1 puff twice daily\par Ventolin Rescue, 2 puffs every 4 hours as needed\par Dymista as directed \par Levaquin 500 mg daily x 7 days\par Prednisone 20 mg 2 tabs daily x 10 days\par Tessalon perles\par Nebulized Albuterol/Ipratropium every 4-6 hours as needed \par \par Follow up 2 weeks\par \par Follow up 2 weeks \par

## 2020-01-10 NOTE — HISTORY OF PRESENT ILLNESS
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 Patient began feeling poorly approximately 3 days ago.  she began experiencing increased cough, wheeze.  Cough is productive yellow/green phlegm.  She is here for acute bronchitis exacerbation asthma.  She reeamins on Nucala.  she is here for these symptoms

## 2020-01-14 DIAGNOSIS — H90.5 UNSPECIFIED SENSORINEURAL HEARING LOSS: ICD-10-CM

## 2020-01-14 DIAGNOSIS — H93.292 OTHER ABNORMAL AUDITORY PERCEPTIONS, LEFT EAR: ICD-10-CM

## 2020-01-14 DIAGNOSIS — H61.22 IMPACTED CERUMEN, LEFT EAR: ICD-10-CM

## 2020-01-23 ENCOUNTER — APPOINTMENT (OUTPATIENT)
Dept: PULMONOLOGY | Facility: CLINIC | Age: 60
End: 2020-01-23

## 2020-01-23 ENCOUNTER — APPOINTMENT (OUTPATIENT)
Age: 60
End: 2020-01-23
Payer: MEDICAID

## 2020-01-23 VITALS
BODY MASS INDEX: 34.55 KG/M2 | WEIGHT: 183 LBS | HEIGHT: 61 IN | RESPIRATION RATE: 17 BRPM | OXYGEN SATURATION: 96 % | DIASTOLIC BLOOD PRESSURE: 70 MMHG | SYSTOLIC BLOOD PRESSURE: 140 MMHG | HEART RATE: 101 BPM

## 2020-01-23 PROCEDURE — 96372 THER/PROPH/DIAG INJ SC/IM: CPT

## 2020-01-23 RX ORDER — MEPOLIZUMAB 100 MG/ML
100 INJECTION, POWDER, FOR SOLUTION SUBCUTANEOUS
Qty: 0 | Refills: 0 | Status: COMPLETED | OUTPATIENT
Start: 2020-01-23

## 2020-01-29 ENCOUNTER — RX RENEWAL (OUTPATIENT)
Age: 60
End: 2020-01-29

## 2020-02-28 ENCOUNTER — APPOINTMENT (OUTPATIENT)
Dept: PULMONOLOGY | Facility: CLINIC | Age: 60
End: 2020-02-28
Payer: MEDICAID

## 2020-02-28 VITALS
HEART RATE: 100 BPM | OXYGEN SATURATION: 98 % | RESPIRATION RATE: 16 BRPM | WEIGHT: 179 LBS | SYSTOLIC BLOOD PRESSURE: 120 MMHG | HEIGHT: 59 IN | BODY MASS INDEX: 36.08 KG/M2 | DIASTOLIC BLOOD PRESSURE: 70 MMHG

## 2020-02-28 PROCEDURE — 99214 OFFICE O/P EST MOD 30 MIN: CPT | Mod: 25

## 2020-02-28 PROCEDURE — 96372 THER/PROPH/DIAG INJ SC/IM: CPT

## 2020-02-28 RX ORDER — MEPOLIZUMAB 100 MG/ML
100 INJECTION, POWDER, FOR SOLUTION SUBCUTANEOUS
Qty: 0 | Refills: 0 | Status: COMPLETED | OUTPATIENT
Start: 2020-02-28

## 2020-02-28 RX ADMIN — MEPOLIZUMAB 1 MG: 100 INJECTION, POWDER, FOR SOLUTION SUBCUTANEOUS at 00:00

## 2020-02-28 NOTE — HISTORY OF PRESENT ILLNESS
[Former] : former [Never] : never [< 30 pack-years] : < 30 pack-years [TextBox_4] : Patient is a 59 year old female Hx severe eosinophilic asthma, HUDSON on CPAP,  presents for follow up, Nucala injection.  she is doing well on Nucala.  She has not been ill.  She continues to Wixela Inhub as directed.  she reports no increased respiratory complaints.

## 2020-02-28 NOTE — ASSESSMENT
[FreeTextEntry1] : 59 year old female severe eosinophilic asthma presents for follow up Nucala injection\par \par Continue Nucala, Continue Wixela Inhub 1 puff BID\par PFT next visit\par \par Follow up 4 months and monthly for Nucala

## 2020-02-28 NOTE — PHYSICAL EXAM
[No Acute Distress] : no acute distress [Normal Oropharynx] : normal oropharynx [Normal Appearance] : normal appearance [No Neck Mass] : no neck mass [Normal Rate/Rhythm] : normal rate/rhythm [No Murmurs] : no murmurs [Normal S1, S2] : normal s1, s2 [No Resp Distress] : no resp distress [No Abnormalities] : no abnormalities [Clear to Auscultation Bilaterally] : clear to auscultation bilaterally [Benign] : benign [Normal Gait] : normal gait [No Clubbing] : no clubbing [No Cyanosis] : no cyanosis [FROM] : FROM [Normal Color/ Pigmentation] : normal color/ pigmentation [No Edema] : no edema [No Focal Deficits] : no focal deficits [Oriented x3] : oriented x3 [Normal Affect] : normal affect

## 2020-03-22 ENCOUNTER — RX RENEWAL (OUTPATIENT)
Age: 60
End: 2020-03-22

## 2020-03-27 ENCOUNTER — APPOINTMENT (OUTPATIENT)
Dept: PULMONOLOGY | Facility: CLINIC | Age: 60
End: 2020-03-27
Payer: MEDICAID

## 2020-03-27 VITALS
RESPIRATION RATE: 17 BRPM | SYSTOLIC BLOOD PRESSURE: 130 MMHG | HEART RATE: 113 BPM | TEMPERATURE: 99.3 F | WEIGHT: 178 LBS | BODY MASS INDEX: 35.88 KG/M2 | DIASTOLIC BLOOD PRESSURE: 80 MMHG | HEIGHT: 59 IN | OXYGEN SATURATION: 98 %

## 2020-03-27 PROCEDURE — 96372 THER/PROPH/DIAG INJ SC/IM: CPT

## 2020-03-27 RX ORDER — MEPOLIZUMAB 100 MG/ML
100 INJECTION, POWDER, FOR SOLUTION SUBCUTANEOUS
Qty: 0 | Refills: 0 | Status: COMPLETED | OUTPATIENT
Start: 2020-03-27

## 2020-04-09 ENCOUNTER — APPOINTMENT (OUTPATIENT)
Dept: PULMONOLOGY | Facility: CLINIC | Age: 60
End: 2020-04-09
Payer: MEDICAID

## 2020-04-09 VITALS
DIASTOLIC BLOOD PRESSURE: 80 MMHG | WEIGHT: 179.2 LBS | OXYGEN SATURATION: 98 % | HEIGHT: 59 IN | BODY MASS INDEX: 36.12 KG/M2 | HEART RATE: 103 BPM | RESPIRATION RATE: 17 BRPM | SYSTOLIC BLOOD PRESSURE: 134 MMHG | TEMPERATURE: 98.2 F

## 2020-04-09 DIAGNOSIS — R05 COUGH: ICD-10-CM

## 2020-04-09 DIAGNOSIS — J45.901 ACUTE BRONCHITIS, UNSPECIFIED: ICD-10-CM

## 2020-04-09 DIAGNOSIS — J20.9 ACUTE BRONCHITIS, UNSPECIFIED: ICD-10-CM

## 2020-04-09 DIAGNOSIS — R06.02 SHORTNESS OF BREATH: ICD-10-CM

## 2020-04-09 PROCEDURE — 99214 OFFICE O/P EST MOD 30 MIN: CPT | Mod: 25

## 2020-04-09 PROCEDURE — 96372 THER/PROPH/DIAG INJ SC/IM: CPT

## 2020-04-09 RX ORDER — METHYLPRED ACET/NACL,ISO-OS/PF 40 MG/ML
40 VIAL (ML) INJECTION
Qty: 1 | Refills: 0 | Status: COMPLETED | OUTPATIENT
Start: 2020-04-09

## 2020-04-09 RX ORDER — DOXYCYCLINE 100 MG/1
100 TABLET, FILM COATED ORAL
Qty: 20 | Refills: 0 | Status: COMPLETED | COMMUNITY
Start: 2020-04-09 | End: 2020-04-09

## 2020-04-09 RX ADMIN — METHYLPREDNISOLONE ACETATE 0 MG/ML: 40 INJECTION, SUSPENSION INTRA-ARTICULAR; INTRALESIONAL; INTRAMUSCULAR; SOFT TISSUE at 00:00

## 2020-04-09 NOTE — REVIEW OF SYSTEMS
[Fever] : no fever [Fatigue] : fatigue [Chills] : no chills [Poor Appetite] : no poor appetite [Cough] : cough [Hemoptysis] : no hemoptysis [Chest Tightness] : chest tightness [Sputum] : sputum [Dyspnea] : dyspnea [Wheezing] : wheezing [Negative] : Endocrine

## 2020-04-09 NOTE — HISTORY OF PRESENT ILLNESS
[Former] : former [< 30 pack-years] : < 30 pack-years [Never] : never [TextBox_4] : Patient is a 59 year old female Hx severe eosinophilic asthma on Nucala injections, HUDSON on CPAP,  who is in for a sick visit. \par Her symptoms started 4 days ago and is progressively worsening.\par She feels SOB, has chest tightness, chest congestion, severe cough with dk green mucus production. She has had persistent wheezing and feels sick. \par She denies fever, chills, appetite issues, GERD symptoms or new aches or pains. \par She has not been able to sleep due to cough fits. Cough fits can be severe. \par She reports she is compliant on her medications. \par She has been needing to nebulize and is out of medications. \par She states that she usually needs oral pred and medrol injection given her previous asthma hx. \par She has been intubated 3x s/p asthma exacerbation. \par

## 2020-04-09 NOTE — PHYSICAL EXAM
[No Acute Distress] : no acute distress [Well Nourished] : well nourished [Well Groomed] : well groomed [No Deformities] : no deformities [Well Developed] : well developed [Normal Oropharynx] : normal oropharynx [Normal Appearance] : normal appearance [Supple] : supple [No Neck Mass] : no neck mass [No JVD] : no jvd [Normal Rate/Rhythm] : normal rate/rhythm [Normal Pulses] : normal pulses [Normal S1, S2] : normal s1, s2 [No Murmurs] : no murmurs [No Resp Distress] : no resp distress [Rhonchi] : rhonchi [No Abnormalities] : no abnormalities [Benign] : benign [Normal Gait] : normal gait [No Clubbing] : no clubbing [No Cyanosis] : no cyanosis [No Edema] : no edema [FROM] : FROM [Normal Color/ Pigmentation] : normal color/ pigmentation [No Focal Deficits] : no focal deficits [Oriented x3] : oriented x3 [Normal Mood] : normal mood [Normal Affect] : normal affect [TextBox_68] : BL rhonci and expiratory wheezes throughout

## 2020-04-09 NOTE — REASON FOR VISIT
[Acute] : an acute visit [Asthma] : asthma [Cough] : cough [Shortness of Breath] : shortness of breath [Wheezing] : wheezing

## 2020-04-09 NOTE — ASSESSMENT
[FreeTextEntry1] : The plan for the patient is as follows:\par \par #1. Acute bronchitis\par -add doxycyline hyclate 100 mg PO BID x 10 days\par \par #2. acute asthma exacerbation with SOB/cough\par -medrol injection today 40 mg LUE\par -add prednisone 30 mg x5d, 20 mg x5d, 10 mg x 5 days- discussed SE of prednisone in detail with the patient including immune suppresion.\par -continue nebulizer with duoneb- will refill- use QID\par -add spiriva respimat 2.5 2 puffs in am (sample given to her- 1.25 dose- upto 4 puffs daily)\par -continue wixela  inhaler 1 puff BID\par \par #3.Eosinophilic asthma\par -continue with nucala injections\par \par #4.Severe cough\par -add tessalon perles 200 mg PO TID\par -add hycodan cough syrup 10ml QHS only for better sleep due to cough fits\par \par pt instructed to update me next week\par discussed med side effects and indication \par she was agreeable.

## 2020-04-28 ENCOUNTER — APPOINTMENT (OUTPATIENT)
Dept: PULMONOLOGY | Facility: CLINIC | Age: 60
End: 2020-04-28
Payer: MEDICAID

## 2020-04-28 VITALS
OXYGEN SATURATION: 97 % | WEIGHT: 180 LBS | HEART RATE: 115 BPM | HEIGHT: 59 IN | TEMPERATURE: 98.1 F | RESPIRATION RATE: 17 BRPM | BODY MASS INDEX: 36.29 KG/M2 | DIASTOLIC BLOOD PRESSURE: 70 MMHG | SYSTOLIC BLOOD PRESSURE: 140 MMHG

## 2020-04-28 PROCEDURE — 96372 THER/PROPH/DIAG INJ SC/IM: CPT

## 2020-04-28 RX ORDER — MEPOLIZUMAB 100 MG/ML
100 INJECTION, POWDER, FOR SOLUTION SUBCUTANEOUS
Qty: 0 | Refills: 0 | Status: COMPLETED | OUTPATIENT
Start: 2020-04-28

## 2020-05-26 ENCOUNTER — APPOINTMENT (OUTPATIENT)
Dept: PULMONOLOGY | Facility: CLINIC | Age: 60
End: 2020-05-26
Payer: MEDICAID

## 2020-05-26 VITALS
BODY MASS INDEX: 36.49 KG/M2 | SYSTOLIC BLOOD PRESSURE: 124 MMHG | RESPIRATION RATE: 17 BRPM | OXYGEN SATURATION: 97 % | TEMPERATURE: 98.2 F | HEART RATE: 73 BPM | HEIGHT: 59 IN | DIASTOLIC BLOOD PRESSURE: 70 MMHG | WEIGHT: 181 LBS

## 2020-05-26 PROCEDURE — 96372 THER/PROPH/DIAG INJ SC/IM: CPT

## 2020-05-26 RX ORDER — MEPOLIZUMAB 100 MG/ML
100 INJECTION, POWDER, FOR SOLUTION SUBCUTANEOUS
Qty: 0 | Refills: 0 | Status: COMPLETED | OUTPATIENT
Start: 2020-05-26

## 2020-06-19 ENCOUNTER — RX RENEWAL (OUTPATIENT)
Age: 60
End: 2020-06-19

## 2020-06-23 ENCOUNTER — APPOINTMENT (OUTPATIENT)
Dept: PULMONOLOGY | Facility: CLINIC | Age: 60
End: 2020-06-23
Payer: MEDICAID

## 2020-06-23 VITALS
SYSTOLIC BLOOD PRESSURE: 144 MMHG | WEIGHT: 184 LBS | HEART RATE: 112 BPM | DIASTOLIC BLOOD PRESSURE: 72 MMHG | HEIGHT: 59 IN | OXYGEN SATURATION: 97 % | TEMPERATURE: 98.1 F | RESPIRATION RATE: 17 BRPM | BODY MASS INDEX: 37.09 KG/M2

## 2020-06-23 PROCEDURE — 96372 THER/PROPH/DIAG INJ SC/IM: CPT

## 2020-06-23 RX ORDER — MEPOLIZUMAB 100 MG/ML
100 INJECTION, POWDER, FOR SOLUTION SUBCUTANEOUS
Qty: 0 | Refills: 0 | Status: COMPLETED | OUTPATIENT
Start: 2020-06-23

## 2020-07-16 ENCOUNTER — RX RENEWAL (OUTPATIENT)
Age: 60
End: 2020-07-16

## 2020-07-17 ENCOUNTER — APPOINTMENT (OUTPATIENT)
Dept: OTOLARYNGOLOGY | Facility: CLINIC | Age: 60
End: 2020-07-17

## 2020-07-21 ENCOUNTER — APPOINTMENT (OUTPATIENT)
Age: 60
End: 2020-07-21
Payer: MEDICAID

## 2020-07-21 VITALS
WEIGHT: 184 LBS | RESPIRATION RATE: 17 BRPM | SYSTOLIC BLOOD PRESSURE: 140 MMHG | DIASTOLIC BLOOD PRESSURE: 82 MMHG | BODY MASS INDEX: 37.09 KG/M2 | HEIGHT: 59 IN | TEMPERATURE: 98 F | OXYGEN SATURATION: 96 % | HEART RATE: 116 BPM

## 2020-07-21 PROCEDURE — 96372 THER/PROPH/DIAG INJ SC/IM: CPT

## 2020-07-21 RX ORDER — MEPOLIZUMAB 100 MG/ML
100 INJECTION, POWDER, FOR SOLUTION SUBCUTANEOUS
Qty: 0 | Refills: 0 | Status: COMPLETED | OUTPATIENT
Start: 2020-07-21

## 2020-07-22 ENCOUNTER — APPOINTMENT (OUTPATIENT)
Dept: ULTRASOUND IMAGING | Facility: CLINIC | Age: 60
End: 2020-07-22

## 2020-07-22 ENCOUNTER — OUTPATIENT (OUTPATIENT)
Dept: OUTPATIENT SERVICES | Facility: HOSPITAL | Age: 60
LOS: 1 days | End: 2020-07-22
Payer: MEDICAID

## 2020-07-22 DIAGNOSIS — Z00.8 ENCOUNTER FOR OTHER GENERAL EXAMINATION: ICD-10-CM

## 2020-07-22 PROCEDURE — 76700 US EXAM ABDOM COMPLETE: CPT

## 2020-07-22 PROCEDURE — 76700 US EXAM ABDOM COMPLETE: CPT | Mod: 26

## 2020-07-22 PROCEDURE — 76856 US EXAM PELVIC COMPLETE: CPT

## 2020-07-22 PROCEDURE — 76856 US EXAM PELVIC COMPLETE: CPT | Mod: 26

## 2020-07-28 ENCOUNTER — APPOINTMENT (OUTPATIENT)
Dept: OTOLARYNGOLOGY | Facility: CLINIC | Age: 60
End: 2020-07-28

## 2020-08-19 ENCOUNTER — APPOINTMENT (OUTPATIENT)
Dept: PULMONOLOGY | Facility: CLINIC | Age: 60
End: 2020-08-19
Payer: MEDICAID

## 2020-08-19 ENCOUNTER — APPOINTMENT (OUTPATIENT)
Age: 60
End: 2020-08-19
Payer: MEDICAID

## 2020-08-19 VITALS
HEIGHT: 59 IN | RESPIRATION RATE: 17 BRPM | OXYGEN SATURATION: 98 % | WEIGHT: 184 LBS | TEMPERATURE: 97.3 F | SYSTOLIC BLOOD PRESSURE: 124 MMHG | HEART RATE: 97 BPM | DIASTOLIC BLOOD PRESSURE: 70 MMHG | BODY MASS INDEX: 37.09 KG/M2

## 2020-08-19 DIAGNOSIS — J82 PULMONARY EOSINOPHILIA, NOT ELSEWHERE CLASSIFIED: ICD-10-CM

## 2020-08-19 DIAGNOSIS — E66.9 OBESITY, UNSPECIFIED: ICD-10-CM

## 2020-08-19 DIAGNOSIS — J30.9 ALLERGIC RHINITIS, UNSPECIFIED: ICD-10-CM

## 2020-08-19 PROCEDURE — ZZZZZ: CPT

## 2020-08-19 PROCEDURE — 99214 OFFICE O/P EST MOD 30 MIN: CPT | Mod: 25

## 2020-08-19 PROCEDURE — 96372 THER/PROPH/DIAG INJ SC/IM: CPT

## 2020-08-19 RX ORDER — MEPOLIZUMAB 100 MG/ML
100 INJECTION, POWDER, FOR SOLUTION SUBCUTANEOUS
Qty: 0 | Refills: 0 | Status: COMPLETED | OUTPATIENT
Start: 2020-08-19

## 2020-08-19 RX ORDER — PREDNISONE 20 MG/1
20 TABLET ORAL DAILY
Qty: 14 | Refills: 1 | Status: ACTIVE | COMMUNITY
Start: 2020-01-10 | End: 1900-01-01

## 2020-08-19 NOTE — PHYSICAL EXAM
[No Acute Distress] : no acute distress [Well Nourished] : well nourished [Well Groomed] : well groomed [Well Developed] : well developed [No Deformities] : no deformities [Normal Oropharynx] : normal oropharynx [Normal Appearance] : normal appearance [Supple] : supple [No Neck Mass] : no neck mass [No JVD] : no jvd [Normal Rate/Rhythm] : normal rate/rhythm [Normal Pulses] : normal pulses [No Murmurs] : no murmurs [Normal S1, S2] : normal s1, s2 [No Resp Distress] : no resp distress [Rhonchi] : rhonchi [No Abnormalities] : no abnormalities [Normal Gait] : normal gait [Benign] : benign [No Cyanosis] : no cyanosis [No Clubbing] : no clubbing [No Edema] : no edema [FROM] : FROM [Normal Color/ Pigmentation] : normal color/ pigmentation [No Focal Deficits] : no focal deficits [Normal Mood] : normal mood [Oriented x3] : oriented x3 [Normal Affect] : normal affect [TextBox_68] : BL rhonci and expiratory wheezes throughout

## 2020-08-19 NOTE — ASSESSMENT
[FreeTextEntry1] : 60 year old female Hx severe eosinophilic asthma, now on Nucalal, Wiela Inhub, Spiriva presents for follow up, traveling to [place]\par \par Continue Nucala injections\par Continue Wixela and Spiriva as directed \par Educated regarding PPE and COVID\par PFT next visit \par \par Follow up when return from [place]

## 2020-08-19 NOTE — HISTORY OF PRESENT ILLNESS
[< 30 pack-years] : < 30 pack-years [TextBox_4] : Patient is a 60 year old female Hx severe eosinophilic asthma presents for follow up.  Patient has been doing well on Nucal treatmetn.  She continues to use Spiriva and Wixela Inhub regularly.  She has been doing well since April.  She is visiting her daughter in [place] within the next few days.  She is here for follow up and Nucala injection.

## 2020-08-19 NOTE — REVIEW OF SYSTEMS
[Fatigue] : fatigue [Cough] : cough [Dyspnea] : dyspnea [Sputum] : sputum [Chest Tightness] : chest tightness [Wheezing] : wheezing [Negative] : Endocrine [Fever] : no fever [Poor Appetite] : no poor appetite [Chills] : no chills [Hemoptysis] : no hemoptysis

## 2020-09-25 ENCOUNTER — APPOINTMENT (OUTPATIENT)
Dept: PULMONOLOGY | Facility: CLINIC | Age: 60
End: 2020-09-25
Payer: MEDICAID

## 2020-09-25 VITALS
TEMPERATURE: 97.1 F | RESPIRATION RATE: 17 BRPM | SYSTOLIC BLOOD PRESSURE: 110 MMHG | DIASTOLIC BLOOD PRESSURE: 70 MMHG | WEIGHT: 182 LBS | OXYGEN SATURATION: 97 % | BODY MASS INDEX: 36.69 KG/M2 | HEART RATE: 110 BPM | HEIGHT: 59 IN

## 2020-09-25 PROCEDURE — 96372 THER/PROPH/DIAG INJ SC/IM: CPT

## 2020-09-25 RX ORDER — MEPOLIZUMAB 100 MG/ML
100 INJECTION, POWDER, FOR SOLUTION SUBCUTANEOUS
Qty: 0 | Refills: 0 | Status: COMPLETED | OUTPATIENT
Start: 2020-09-25

## 2020-10-14 ENCOUNTER — APPOINTMENT (OUTPATIENT)
Dept: MRI IMAGING | Facility: CLINIC | Age: 60
End: 2020-10-14

## 2020-10-22 ENCOUNTER — APPOINTMENT (OUTPATIENT)
Dept: PULMONOLOGY | Facility: CLINIC | Age: 60
End: 2020-10-22
Payer: MEDICAID

## 2020-10-22 VITALS — OXYGEN SATURATION: 97 % | RESPIRATION RATE: 17 BRPM | TEMPERATURE: 96.7 F

## 2020-10-22 PROCEDURE — 96372 THER/PROPH/DIAG INJ SC/IM: CPT

## 2020-10-22 PROCEDURE — 99072 ADDL SUPL MATRL&STAF TM PHE: CPT

## 2020-10-22 RX ORDER — MEPOLIZUMAB 100 MG/ML
100 INJECTION, POWDER, FOR SOLUTION SUBCUTANEOUS
Qty: 0 | Refills: 0 | Status: COMPLETED | OUTPATIENT
Start: 2020-10-22

## 2020-11-19 ENCOUNTER — APPOINTMENT (OUTPATIENT)
Dept: PULMONOLOGY | Facility: CLINIC | Age: 60
End: 2020-11-19
Payer: MEDICAID

## 2020-11-19 PROCEDURE — 96372 THER/PROPH/DIAG INJ SC/IM: CPT

## 2020-11-19 RX ORDER — MEPOLIZUMAB 100 MG/ML
100 INJECTION, POWDER, FOR SOLUTION SUBCUTANEOUS
Qty: 0 | Refills: 0 | Status: COMPLETED | OUTPATIENT
Start: 2020-11-19

## 2020-11-19 RX ORDER — SERTRALINE HYDROCHLORIDE 100 MG/1
100 TABLET, FILM COATED ORAL
Qty: 1 | Refills: 3 | Status: ACTIVE | COMMUNITY
Start: 2017-10-24

## 2020-11-19 RX ADMIN — MEPOLIZUMAB 1 MG: 100 INJECTION, POWDER, FOR SOLUTION SUBCUTANEOUS at 00:00

## 2020-12-18 ENCOUNTER — APPOINTMENT (OUTPATIENT)
Dept: PULMONOLOGY | Facility: CLINIC | Age: 60
End: 2020-12-18
Payer: MEDICAID

## 2020-12-18 PROCEDURE — 99072 ADDL SUPL MATRL&STAF TM PHE: CPT

## 2020-12-18 PROCEDURE — 96372 THER/PROPH/DIAG INJ SC/IM: CPT

## 2020-12-18 RX ORDER — MEPOLIZUMAB 100 MG/ML
100 INJECTION, POWDER, FOR SOLUTION SUBCUTANEOUS
Qty: 0 | Refills: 0 | Status: COMPLETED | OUTPATIENT
Start: 2020-12-17

## 2020-12-21 PROBLEM — Z87.09 HISTORY OF ACUTE SINUSITIS: Status: RESOLVED | Noted: 2019-03-29 | Resolved: 2020-12-21

## 2020-12-23 PROBLEM — Z87.09 HISTORY OF ACUTE BRONCHITIS WITH BRONCHOSPASM: Status: RESOLVED | Noted: 2018-12-31 | Resolved: 2020-12-23

## 2021-01-14 ENCOUNTER — APPOINTMENT (OUTPATIENT)
Dept: PULMONOLOGY | Facility: CLINIC | Age: 61
End: 2021-01-14

## 2021-01-22 ENCOUNTER — APPOINTMENT (OUTPATIENT)
Dept: PULMONOLOGY | Facility: CLINIC | Age: 61
End: 2021-01-22
Payer: MEDICAID

## 2021-01-22 VITALS
HEART RATE: 78 BPM | OXYGEN SATURATION: 97 % | TEMPERATURE: 97.3 F | RESPIRATION RATE: 16 BRPM | SYSTOLIC BLOOD PRESSURE: 110 MMHG | BODY MASS INDEX: 38.51 KG/M2 | HEIGHT: 59 IN | WEIGHT: 191 LBS | DIASTOLIC BLOOD PRESSURE: 82 MMHG

## 2021-01-22 PROCEDURE — 96372 THER/PROPH/DIAG INJ SC/IM: CPT

## 2021-01-22 PROCEDURE — 99072 ADDL SUPL MATRL&STAF TM PHE: CPT

## 2021-01-22 RX ORDER — MEPOLIZUMAB 100 MG/ML
100 INJECTION, POWDER, FOR SOLUTION SUBCUTANEOUS
Qty: 0 | Refills: 0 | Status: COMPLETED | OUTPATIENT
Start: 2021-01-22

## 2021-02-05 ENCOUNTER — APPOINTMENT (OUTPATIENT)
Dept: PULMONOLOGY | Facility: CLINIC | Age: 61
End: 2021-02-05
Payer: MEDICAID

## 2021-02-05 VITALS
HEIGHT: 59 IN | BODY MASS INDEX: 38.51 KG/M2 | RESPIRATION RATE: 16 BRPM | DIASTOLIC BLOOD PRESSURE: 80 MMHG | OXYGEN SATURATION: 98 % | TEMPERATURE: 97.7 F | SYSTOLIC BLOOD PRESSURE: 128 MMHG | WEIGHT: 191 LBS | HEART RATE: 105 BPM

## 2021-02-05 DIAGNOSIS — Z71.89 OTHER SPECIFIED COUNSELING: ICD-10-CM

## 2021-02-05 PROCEDURE — 99072 ADDL SUPL MATRL&STAF TM PHE: CPT

## 2021-02-05 PROCEDURE — 99213 OFFICE O/P EST LOW 20 MIN: CPT

## 2021-02-05 NOTE — REVIEW OF SYSTEMS
[Fatigue] : fatigue [Cough] : cough [Chest Tightness] : chest tightness [Sputum] : sputum [Dyspnea] : dyspnea [Wheezing] : wheezing [Negative] : Endocrine [Fever] : no fever [Chills] : no chills [Poor Appetite] : no poor appetite [Hemoptysis] : no hemoptysis

## 2021-02-05 NOTE — ASSESSMENT
[FreeTextEntry1] : 60 year old female Hx severe eosinophilic asthma, now on Nucalal, Wiela Inhub, Spiriva presents for follow up, traveling to [place]\par \par Continue Nucala injections\par Continue Wixela and Spiriva as directed \par Patient encouraged to gargle with warm water and salt, use Ibuprophen as directed  \par PFT next visit \par \par Follow up with PFT

## 2021-02-05 NOTE — HISTORY OF PRESENT ILLNESS
[< 30 pack-years] : < 30 pack-years [TextBox_4] : Patient is a 60 year old female Hx severe eosinophilic asthma presents for follow up.  Patient has been doing well on Nucal treatment.  She continues to use Spiriva and Wixela Inhub regularly.  She received first COVID vaccine and was concerned because she developed sore throat.  she is otherwise well and without increased respiratory complaints.

## 2021-02-16 ENCOUNTER — APPOINTMENT (OUTPATIENT)
Dept: PULMONOLOGY | Facility: CLINIC | Age: 61
End: 2021-02-16

## 2021-02-19 ENCOUNTER — APPOINTMENT (OUTPATIENT)
Dept: PULMONOLOGY | Facility: CLINIC | Age: 61
End: 2021-02-19
Payer: MEDICAID

## 2021-02-19 PROCEDURE — 96372 THER/PROPH/DIAG INJ SC/IM: CPT

## 2021-02-19 PROCEDURE — 99072 ADDL SUPL MATRL&STAF TM PHE: CPT

## 2021-02-19 RX ORDER — MEPOLIZUMAB 100 MG/ML
100 INJECTION, POWDER, FOR SOLUTION SUBCUTANEOUS
Qty: 0 | Refills: 0 | Status: COMPLETED | OUTPATIENT
Start: 2021-02-19

## 2021-02-19 RX ADMIN — MEPOLIZUMAB 1 MG: 100 INJECTION, POWDER, FOR SOLUTION SUBCUTANEOUS at 00:00

## 2021-03-01 ENCOUNTER — RX RENEWAL (OUTPATIENT)
Age: 61
End: 2021-03-01

## 2021-03-01 RX ORDER — TIOTROPIUM BROMIDE INHALATION SPRAY 3.12 UG/1
2.5 SPRAY, METERED RESPIRATORY (INHALATION) DAILY
Qty: 1 | Refills: 3 | Status: ACTIVE | COMMUNITY
Start: 2020-04-09 | End: 1900-01-01

## 2021-03-19 ENCOUNTER — APPOINTMENT (OUTPATIENT)
Dept: PULMONOLOGY | Facility: CLINIC | Age: 61
End: 2021-03-19
Payer: MEDICAID

## 2021-03-19 VITALS
OXYGEN SATURATION: 98 % | RESPIRATION RATE: 17 BRPM | TEMPERATURE: 97.1 F | DIASTOLIC BLOOD PRESSURE: 82 MMHG | BODY MASS INDEX: 38.71 KG/M2 | HEART RATE: 80 BPM | HEIGHT: 59 IN | WEIGHT: 192 LBS | SYSTOLIC BLOOD PRESSURE: 140 MMHG

## 2021-03-19 PROCEDURE — 99072 ADDL SUPL MATRL&STAF TM PHE: CPT

## 2021-03-19 PROCEDURE — 96372 THER/PROPH/DIAG INJ SC/IM: CPT

## 2021-03-19 RX ORDER — MEPOLIZUMAB 100 MG/ML
100 INJECTION, POWDER, FOR SOLUTION SUBCUTANEOUS
Qty: 0 | Refills: 0 | Status: COMPLETED | OUTPATIENT
Start: 2021-03-19

## 2021-03-19 RX ORDER — BUDESONIDE AND FORMOTEROL FUMARATE DIHYDRATE 160; 4.5 UG/1; UG/1
160-4.5 AEROSOL RESPIRATORY (INHALATION) TWICE DAILY
Qty: 1 | Refills: 3 | Status: ACTIVE | COMMUNITY
Start: 2018-12-31 | End: 1900-01-01

## 2021-03-19 RX ORDER — FLUTICASONE PROPIONATE AND SALMETEROL 250; 50 UG/1; UG/1
250-50 POWDER RESPIRATORY (INHALATION)
Qty: 1 | Refills: 3 | Status: DISCONTINUED | COMMUNITY
Start: 2020-06-19 | End: 2021-03-19

## 2021-04-09 NOTE — DIETITIAN INITIAL EVALUATION ADULT. - COPY OF WEIGHT IN KG
[FreeTextEntry1] : Ms. RAFITA DOBBS, 41 year old female, former smoker (4 years 1/2 ppd, quit 2006), with no significant past medical history who presented with diplopia and ptosis in 12/2020 soon after diagnosed with Myasthenia Gravis; further workup demonstrated mediastinal mass on Chest CT 2/2021.\par  \par CT Chest on 2/5/2021 ([facility]):\par - 2.4 x 3.5 x 3.5 cm Left sided paracardiac mass anterolateral to the main pulmonary trunk (2:57) \par - Calcified left subcarinal lymph node\par - LLL calcified granuloma\par \par Patient is here today for CT Sx consultation, referred by Dr. Michael Alvarado (Neuro).\par \par She endorses improvement in diplopia and ptosis symptoms since she has been on Mestinon with the addition of Prednisone. She is currently on prednisone taper, now on 25 mg for the next month, then to be decreased to 20 mg the next month and so on. She endorses occasional eye flutter and left foot weakness in the past month. She has hx of several left knee surgeries and feels that her left foot weakness may be attributed to that. Patient denies shortness of breath, cough, difficulty swallowing, chest pain, fever, chills. \par \par  47.6

## 2021-04-16 ENCOUNTER — APPOINTMENT (OUTPATIENT)
Dept: PULMONOLOGY | Facility: CLINIC | Age: 61
End: 2021-04-16
Payer: MEDICAID

## 2021-04-16 VITALS
DIASTOLIC BLOOD PRESSURE: 80 MMHG | HEIGHT: 59 IN | TEMPERATURE: 97.4 F | BODY MASS INDEX: 38.71 KG/M2 | SYSTOLIC BLOOD PRESSURE: 128 MMHG | RESPIRATION RATE: 16 BRPM | OXYGEN SATURATION: 97 % | HEART RATE: 97 BPM | WEIGHT: 192 LBS

## 2021-04-16 PROCEDURE — 99072 ADDL SUPL MATRL&STAF TM PHE: CPT

## 2021-04-16 PROCEDURE — 96372 THER/PROPH/DIAG INJ SC/IM: CPT

## 2021-04-16 RX ORDER — MEPOLIZUMAB 100 MG/ML
100 INJECTION, POWDER, FOR SOLUTION SUBCUTANEOUS
Qty: 0 | Refills: 0 | Status: COMPLETED | OUTPATIENT
Start: 2021-04-16

## 2021-04-16 RX ADMIN — MEPOLIZUMAB 1 MG: 100 INJECTION, POWDER, FOR SOLUTION SUBCUTANEOUS at 00:00

## 2021-04-22 DIAGNOSIS — Z01.812 ENCOUNTER FOR PREPROCEDURAL LABORATORY EXAMINATION: ICD-10-CM

## 2021-04-24 ENCOUNTER — APPOINTMENT (OUTPATIENT)
Dept: DISASTER EMERGENCY | Facility: CLINIC | Age: 61
End: 2021-04-24

## 2021-04-25 LAB — SARS-COV-2 N GENE NPH QL NAA+PROBE: NOT DETECTED

## 2021-04-27 ENCOUNTER — APPOINTMENT (OUTPATIENT)
Dept: PULMONOLOGY | Facility: CLINIC | Age: 61
End: 2021-04-27
Payer: MEDICAID

## 2021-04-27 VITALS
BODY MASS INDEX: 37.9 KG/M2 | RESPIRATION RATE: 16 BRPM | HEART RATE: 105 BPM | OXYGEN SATURATION: 98 % | DIASTOLIC BLOOD PRESSURE: 80 MMHG | WEIGHT: 188 LBS | TEMPERATURE: 97.6 F | HEIGHT: 59 IN | SYSTOLIC BLOOD PRESSURE: 120 MMHG

## 2021-04-27 DIAGNOSIS — G47.33 OBSTRUCTIVE SLEEP APNEA (ADULT) (PEDIATRIC): ICD-10-CM

## 2021-04-27 DIAGNOSIS — R76.8 OTHER SPECIFIED ABNORMAL IMMUNOLOGICAL FINDINGS IN SERUM: ICD-10-CM

## 2021-04-27 DIAGNOSIS — J82.83 EOSINOPHILIC ASTHMA: ICD-10-CM

## 2021-04-27 PROCEDURE — 94010 BREATHING CAPACITY TEST: CPT

## 2021-04-27 PROCEDURE — ZZZZZ: CPT

## 2021-04-27 PROCEDURE — 94729 DIFFUSING CAPACITY: CPT

## 2021-04-27 PROCEDURE — 99214 OFFICE O/P EST MOD 30 MIN: CPT | Mod: 25

## 2021-04-27 PROCEDURE — 94727 GAS DIL/WSHOT DETER LNG VOL: CPT

## 2021-04-27 PROCEDURE — 99072 ADDL SUPL MATRL&STAF TM PHE: CPT

## 2021-04-27 NOTE — HISTORY OF PRESENT ILLNESS
[< 30 pack-years] : < 30 pack-years [TextBox_4] : Patient is a 60 year old female Hx severe eosinophilic asthma presents for follow up.  Patient has been doing well on Nucala treatment.  She continues to use Spiriva and Wixela Inhub regularly.  She received  COVID vaccine.  She is traveling to [place] this month.  She reports no increased respiratory complaints.

## 2021-04-27 NOTE — ASSESSMENT
[FreeTextEntry1] : 60 year old female Hx severe eosinophilic asthma, now on Nucala, Wiela Inhub, Spiriva presents for follow up, traveling to [place]\par \par Continue Nucala injections\par Continue Wixela and Spiriva as directed \par Patient encouraged to gargle with warm water and salt, use Ibuprophen as directed  \par  1 person assist

## 2021-04-27 NOTE — PROCEDURE
[FreeTextEntry1] : CXR 12/10/2019 clear lungs \par \par PFT 4/27/21 personally reviewed minimal obstructive ventilatory defect without gas exchange abnormality

## 2021-05-06 DIAGNOSIS — Z01.818 ENCOUNTER FOR OTHER PREPROCEDURAL EXAMINATION: ICD-10-CM

## 2021-05-07 ENCOUNTER — APPOINTMENT (OUTPATIENT)
Dept: DISASTER EMERGENCY | Facility: CLINIC | Age: 61
End: 2021-05-07

## 2021-05-13 ENCOUNTER — APPOINTMENT (OUTPATIENT)
Age: 61
End: 2021-05-13

## 2021-06-15 ENCOUNTER — RX RENEWAL (OUTPATIENT)
Age: 61
End: 2021-06-15

## 2021-06-28 RX ORDER — MEPOLIZUMAB 100 MG/ML
100 INJECTION, POWDER, FOR SOLUTION SUBCUTANEOUS
Qty: 1 | Refills: 5 | Status: ACTIVE | COMMUNITY
Start: 2021-01-14 | End: 1900-01-01

## 2021-07-02 ENCOUNTER — APPOINTMENT (OUTPATIENT)
Dept: PULMONOLOGY | Facility: CLINIC | Age: 61
End: 2021-07-02
Payer: MEDICAID

## 2021-07-02 VITALS
OXYGEN SATURATION: 98 % | WEIGHT: 187 LBS | SYSTOLIC BLOOD PRESSURE: 124 MMHG | HEART RATE: 87 BPM | HEIGHT: 59 IN | DIASTOLIC BLOOD PRESSURE: 80 MMHG | TEMPERATURE: 97.3 F | RESPIRATION RATE: 16 BRPM | BODY MASS INDEX: 37.7 KG/M2

## 2021-07-02 PROCEDURE — 96372 THER/PROPH/DIAG INJ SC/IM: CPT

## 2021-07-02 RX ORDER — MEPOLIZUMAB 100 MG/ML
100 INJECTION, POWDER, FOR SOLUTION SUBCUTANEOUS
Qty: 0 | Refills: 0 | Status: COMPLETED | OUTPATIENT
Start: 2021-07-02

## 2021-07-30 ENCOUNTER — APPOINTMENT (OUTPATIENT)
Dept: PULMONOLOGY | Facility: CLINIC | Age: 61
End: 2021-07-30
Payer: MEDICAID

## 2021-07-30 VITALS
OXYGEN SATURATION: 97 % | RESPIRATION RATE: 16 BRPM | HEIGHT: 59 IN | BODY MASS INDEX: 37.29 KG/M2 | WEIGHT: 185 LBS | DIASTOLIC BLOOD PRESSURE: 70 MMHG | TEMPERATURE: 97.2 F | HEART RATE: 88 BPM | SYSTOLIC BLOOD PRESSURE: 120 MMHG

## 2021-07-30 PROCEDURE — 96372 THER/PROPH/DIAG INJ SC/IM: CPT

## 2021-07-30 RX ORDER — MEPOLIZUMAB 100 MG/ML
100 INJECTION, POWDER, FOR SOLUTION SUBCUTANEOUS
Qty: 0 | Refills: 0 | Status: COMPLETED | OUTPATIENT
Start: 2021-07-30

## 2021-08-27 ENCOUNTER — APPOINTMENT (OUTPATIENT)
Age: 61
End: 2021-08-27
Payer: MEDICAID

## 2021-08-27 VITALS
WEIGHT: 187 LBS | BODY MASS INDEX: 37.7 KG/M2 | DIASTOLIC BLOOD PRESSURE: 80 MMHG | HEART RATE: 97 BPM | OXYGEN SATURATION: 96 % | SYSTOLIC BLOOD PRESSURE: 143 MMHG | HEIGHT: 59 IN | TEMPERATURE: 98.1 F | RESPIRATION RATE: 16 BRPM

## 2021-08-27 PROCEDURE — 96372 THER/PROPH/DIAG INJ SC/IM: CPT

## 2021-08-27 RX ORDER — MEPOLIZUMAB 100 MG/ML
100 INJECTION, POWDER, FOR SOLUTION SUBCUTANEOUS
Qty: 0 | Refills: 0 | Status: COMPLETED | OUTPATIENT
Start: 2021-08-26

## 2021-10-24 ENCOUNTER — EMERGENCY (EMERGENCY)
Facility: HOSPITAL | Age: 61
LOS: 1 days | Discharge: HOME | End: 2021-10-24
Attending: EMERGENCY MEDICINE
Payer: MEDICAID

## 2021-10-24 VITALS
DIASTOLIC BLOOD PRESSURE: 84 MMHG | RESPIRATION RATE: 18 BRPM | OXYGEN SATURATION: 96 % | HEART RATE: 91 BPM | SYSTOLIC BLOOD PRESSURE: 121 MMHG

## 2021-10-24 VITALS
SYSTOLIC BLOOD PRESSURE: 129 MMHG | OXYGEN SATURATION: 95 % | HEIGHT: 61 IN | HEART RATE: 119 BPM | WEIGHT: 182.98 LBS | TEMPERATURE: 99 F | DIASTOLIC BLOOD PRESSURE: 86 MMHG | RESPIRATION RATE: 18 BRPM

## 2021-10-24 LAB
ALBUMIN SERPL ELPH-MCNC: 4.7 G/DL — SIGNIFICANT CHANGE UP (ref 3.3–5)
ALP SERPL-CCNC: 92 U/L — SIGNIFICANT CHANGE UP (ref 40–120)
ALT FLD-CCNC: 19 U/L — SIGNIFICANT CHANGE UP (ref 10–45)
ANION GAP SERPL CALC-SCNC: 17 MMOL/L — SIGNIFICANT CHANGE UP (ref 5–17)
APPEARANCE UR: CLEAR — SIGNIFICANT CHANGE UP
AST SERPL-CCNC: 16 U/L — SIGNIFICANT CHANGE UP (ref 10–40)
BACTERIA # UR AUTO: NEGATIVE — SIGNIFICANT CHANGE UP
BASE EXCESS BLDV CALC-SCNC: -1.3 MMOL/L — SIGNIFICANT CHANGE UP (ref -2–2)
BASE EXCESS BLDV CALC-SCNC: 2.9 MMOL/L — HIGH (ref -2–2)
BASOPHILS # BLD AUTO: 0.03 K/UL — SIGNIFICANT CHANGE UP (ref 0–0.2)
BASOPHILS NFR BLD AUTO: 0.3 % — SIGNIFICANT CHANGE UP (ref 0–2)
BILIRUB SERPL-MCNC: 0.2 MG/DL — SIGNIFICANT CHANGE UP (ref 0.2–1.2)
BILIRUB UR-MCNC: NEGATIVE — SIGNIFICANT CHANGE UP
BUN SERPL-MCNC: 16 MG/DL — SIGNIFICANT CHANGE UP (ref 7–23)
CA-I SERPL-SCNC: 1.14 MMOL/L — LOW (ref 1.15–1.33)
CA-I SERPL-SCNC: 1.23 MMOL/L — SIGNIFICANT CHANGE UP (ref 1.15–1.33)
CALCIUM SERPL-MCNC: 9.8 MG/DL — SIGNIFICANT CHANGE UP (ref 8.4–10.5)
CHLORIDE BLDV-SCNC: 101 MMOL/L — SIGNIFICANT CHANGE UP (ref 96–108)
CHLORIDE BLDV-SCNC: 104 MMOL/L — SIGNIFICANT CHANGE UP (ref 96–108)
CHLORIDE SERPL-SCNC: 98 MMOL/L — SIGNIFICANT CHANGE UP (ref 96–108)
CO2 BLDV-SCNC: 27 MMOL/L — HIGH (ref 22–26)
CO2 BLDV-SCNC: 29 MMOL/L — HIGH (ref 22–26)
CO2 SERPL-SCNC: 22 MMOL/L — SIGNIFICANT CHANGE UP (ref 22–31)
COLOR SPEC: SIGNIFICANT CHANGE UP
CREAT SERPL-MCNC: 0.75 MG/DL — SIGNIFICANT CHANGE UP (ref 0.5–1.3)
DIFF PNL FLD: NEGATIVE — SIGNIFICANT CHANGE UP
EOSINOPHIL # BLD AUTO: 0 K/UL — SIGNIFICANT CHANGE UP (ref 0–0.5)
EOSINOPHIL NFR BLD AUTO: 0 % — SIGNIFICANT CHANGE UP (ref 0–6)
EPI CELLS # UR: 0 /HPF — SIGNIFICANT CHANGE UP
GAS PNL BLDV: 135 MMOL/L — LOW (ref 136–145)
GAS PNL BLDV: 136 MMOL/L — SIGNIFICANT CHANGE UP (ref 136–145)
GAS PNL BLDV: SIGNIFICANT CHANGE UP
GLUCOSE BLDV-MCNC: 118 MG/DL — HIGH (ref 70–99)
GLUCOSE BLDV-MCNC: 228 MG/DL — HIGH (ref 70–99)
GLUCOSE SERPL-MCNC: 214 MG/DL — HIGH (ref 70–99)
GLUCOSE UR QL: ABNORMAL
HCO3 BLDV-SCNC: 25 MMOL/L — SIGNIFICANT CHANGE UP (ref 22–29)
HCO3 BLDV-SCNC: 28 MMOL/L — SIGNIFICANT CHANGE UP (ref 22–29)
HCT VFR BLD CALC: 42.8 % — SIGNIFICANT CHANGE UP (ref 34.5–45)
HCT VFR BLDA CALC: 36 % — SIGNIFICANT CHANGE UP (ref 34.5–46.5)
HCT VFR BLDA CALC: 43 % — SIGNIFICANT CHANGE UP (ref 34.5–46.5)
HGB BLD CALC-MCNC: 12.1 G/DL — SIGNIFICANT CHANGE UP (ref 11.7–16.1)
HGB BLD CALC-MCNC: 14.3 G/DL — SIGNIFICANT CHANGE UP (ref 11.7–16.1)
HGB BLD-MCNC: 14.1 G/DL — SIGNIFICANT CHANGE UP (ref 11.5–15.5)
HYALINE CASTS # UR AUTO: 0 /LPF — SIGNIFICANT CHANGE UP (ref 0–2)
IMM GRANULOCYTES NFR BLD AUTO: 0.7 % — SIGNIFICANT CHANGE UP (ref 0–1.5)
KETONES UR-MCNC: NEGATIVE — SIGNIFICANT CHANGE UP
LACTATE BLDV-MCNC: 1.6 MMOL/L — SIGNIFICANT CHANGE UP (ref 0.7–2)
LACTATE BLDV-MCNC: 3.1 MMOL/L — HIGH (ref 0.7–2)
LEUKOCYTE ESTERASE UR-ACNC: NEGATIVE — SIGNIFICANT CHANGE UP
LIDOCAIN IGE QN: 34 U/L — SIGNIFICANT CHANGE UP (ref 7–60)
LYMPHOCYTES # BLD AUTO: 1.01 K/UL — SIGNIFICANT CHANGE UP (ref 1–3.3)
LYMPHOCYTES # BLD AUTO: 10.9 % — LOW (ref 13–44)
MAGNESIUM SERPL-MCNC: 2.3 MG/DL — SIGNIFICANT CHANGE UP (ref 1.6–2.6)
MCHC RBC-ENTMCNC: 28.7 PG — SIGNIFICANT CHANGE UP (ref 27–34)
MCHC RBC-ENTMCNC: 32.9 GM/DL — SIGNIFICANT CHANGE UP (ref 32–36)
MCV RBC AUTO: 87 FL — SIGNIFICANT CHANGE UP (ref 80–100)
MONOCYTES # BLD AUTO: 0.26 K/UL — SIGNIFICANT CHANGE UP (ref 0–0.9)
MONOCYTES NFR BLD AUTO: 2.8 % — SIGNIFICANT CHANGE UP (ref 2–14)
NEUTROPHILS # BLD AUTO: 7.87 K/UL — HIGH (ref 1.8–7.4)
NEUTROPHILS NFR BLD AUTO: 85.3 % — HIGH (ref 43–77)
NITRITE UR-MCNC: NEGATIVE — SIGNIFICANT CHANGE UP
NRBC # BLD: 0 /100 WBCS — SIGNIFICANT CHANGE UP (ref 0–0)
PCO2 BLDV: 43 MMHG — HIGH (ref 39–42)
PCO2 BLDV: 49 MMHG — HIGH (ref 39–42)
PH BLDV: 7.32 — SIGNIFICANT CHANGE UP (ref 7.32–7.43)
PH BLDV: 7.42 — SIGNIFICANT CHANGE UP (ref 7.32–7.43)
PH UR: 6.5 — SIGNIFICANT CHANGE UP (ref 5–8)
PLATELET # BLD AUTO: 332 K/UL — SIGNIFICANT CHANGE UP (ref 150–400)
PO2 BLDV: 41 MMHG — SIGNIFICANT CHANGE UP (ref 25–45)
PO2 BLDV: 42 MMHG — SIGNIFICANT CHANGE UP (ref 25–45)
POTASSIUM BLDV-SCNC: 4.2 MMOL/L — SIGNIFICANT CHANGE UP (ref 3.5–5.1)
POTASSIUM BLDV-SCNC: 4.3 MMOL/L — SIGNIFICANT CHANGE UP (ref 3.5–5.1)
POTASSIUM SERPL-MCNC: 4.1 MMOL/L — SIGNIFICANT CHANGE UP (ref 3.5–5.3)
POTASSIUM SERPL-SCNC: 4.1 MMOL/L — SIGNIFICANT CHANGE UP (ref 3.5–5.3)
PROT SERPL-MCNC: 7.9 G/DL — SIGNIFICANT CHANGE UP (ref 6–8.3)
PROT UR-MCNC: NEGATIVE — SIGNIFICANT CHANGE UP
RBC # BLD: 4.92 M/UL — SIGNIFICANT CHANGE UP (ref 3.8–5.2)
RBC # FLD: 13.9 % — SIGNIFICANT CHANGE UP (ref 10.3–14.5)
RBC CASTS # UR COMP ASSIST: 0 /HPF — SIGNIFICANT CHANGE UP (ref 0–4)
SAO2 % BLDV: 69 % — SIGNIFICANT CHANGE UP (ref 67–88)
SAO2 % BLDV: 74 % — SIGNIFICANT CHANGE UP (ref 67–88)
SARS-COV-2 RNA SPEC QL NAA+PROBE: SIGNIFICANT CHANGE UP
SODIUM SERPL-SCNC: 137 MMOL/L — SIGNIFICANT CHANGE UP (ref 135–145)
SP GR SPEC: 1.03 — HIGH (ref 1.01–1.02)
UROBILINOGEN FLD QL: NEGATIVE — SIGNIFICANT CHANGE UP
WBC # BLD: 9.23 K/UL — SIGNIFICANT CHANGE UP (ref 3.8–10.5)
WBC # FLD AUTO: 9.23 K/UL — SIGNIFICANT CHANGE UP (ref 3.8–10.5)
WBC UR QL: 2 /HPF — SIGNIFICANT CHANGE UP (ref 0–5)

## 2021-10-24 PROCEDURE — 82565 ASSAY OF CREATININE: CPT

## 2021-10-24 PROCEDURE — 81001 URINALYSIS AUTO W/SCOPE: CPT

## 2021-10-24 PROCEDURE — 96375 TX/PRO/DX INJ NEW DRUG ADDON: CPT

## 2021-10-24 PROCEDURE — 85018 HEMOGLOBIN: CPT

## 2021-10-24 PROCEDURE — 71045 X-RAY EXAM CHEST 1 VIEW: CPT | Mod: 26

## 2021-10-24 PROCEDURE — 99285 EMERGENCY DEPT VISIT HI MDM: CPT | Mod: 25

## 2021-10-24 PROCEDURE — 96361 HYDRATE IV INFUSION ADD-ON: CPT

## 2021-10-24 PROCEDURE — 74174 CTA ABD&PLVS W/CONTRAST: CPT | Mod: 26,MA

## 2021-10-24 PROCEDURE — 36415 COLL VENOUS BLD VENIPUNCTURE: CPT

## 2021-10-24 PROCEDURE — 87077 CULTURE AEROBIC IDENTIFY: CPT

## 2021-10-24 PROCEDURE — 85014 HEMATOCRIT: CPT

## 2021-10-24 PROCEDURE — 80053 COMPREHEN METABOLIC PANEL: CPT

## 2021-10-24 PROCEDURE — 83735 ASSAY OF MAGNESIUM: CPT

## 2021-10-24 PROCEDURE — 87150 DNA/RNA AMPLIFIED PROBE: CPT

## 2021-10-24 PROCEDURE — 87086 URINE CULTURE/COLONY COUNT: CPT

## 2021-10-24 PROCEDURE — 85025 COMPLETE CBC W/AUTO DIFF WBC: CPT

## 2021-10-24 PROCEDURE — 93010 ELECTROCARDIOGRAM REPORT: CPT

## 2021-10-24 PROCEDURE — 96374 THER/PROPH/DIAG INJ IV PUSH: CPT | Mod: XU

## 2021-10-24 PROCEDURE — 84132 ASSAY OF SERUM POTASSIUM: CPT

## 2021-10-24 PROCEDURE — 82330 ASSAY OF CALCIUM: CPT

## 2021-10-24 PROCEDURE — 83605 ASSAY OF LACTIC ACID: CPT

## 2021-10-24 PROCEDURE — 82947 ASSAY GLUCOSE BLOOD QUANT: CPT

## 2021-10-24 PROCEDURE — 87040 BLOOD CULTURE FOR BACTERIA: CPT

## 2021-10-24 PROCEDURE — 71045 X-RAY EXAM CHEST 1 VIEW: CPT

## 2021-10-24 PROCEDURE — U0003: CPT

## 2021-10-24 PROCEDURE — 93005 ELECTROCARDIOGRAM TRACING: CPT

## 2021-10-24 PROCEDURE — 82435 ASSAY OF BLOOD CHLORIDE: CPT

## 2021-10-24 PROCEDURE — 99284 EMERGENCY DEPT VISIT MOD MDM: CPT | Mod: 25

## 2021-10-24 PROCEDURE — U0005: CPT

## 2021-10-24 PROCEDURE — 74174 CTA ABD&PLVS W/CONTRAST: CPT | Mod: MA

## 2021-10-24 PROCEDURE — 84295 ASSAY OF SERUM SODIUM: CPT

## 2021-10-24 PROCEDURE — 83690 ASSAY OF LIPASE: CPT

## 2021-10-24 PROCEDURE — 82803 BLOOD GASES ANY COMBINATION: CPT

## 2021-10-24 RX ORDER — SODIUM CHLORIDE 9 MG/ML
1000 INJECTION INTRAMUSCULAR; INTRAVENOUS; SUBCUTANEOUS ONCE
Refills: 0 | Status: COMPLETED | OUTPATIENT
Start: 2021-10-24 | End: 2021-10-24

## 2021-10-24 RX ORDER — KETOROLAC TROMETHAMINE 30 MG/ML
15 SYRINGE (ML) INJECTION ONCE
Refills: 0 | Status: DISCONTINUED | OUTPATIENT
Start: 2021-10-24 | End: 2021-10-24

## 2021-10-24 RX ORDER — MORPHINE SULFATE 50 MG/1
4 CAPSULE, EXTENDED RELEASE ORAL ONCE
Refills: 0 | Status: DISCONTINUED | OUTPATIENT
Start: 2021-10-24 | End: 2021-10-24

## 2021-10-24 RX ADMIN — SODIUM CHLORIDE 1000 MILLILITER(S): 9 INJECTION INTRAMUSCULAR; INTRAVENOUS; SUBCUTANEOUS at 17:17

## 2021-10-24 RX ADMIN — MORPHINE SULFATE 4 MILLIGRAM(S): 50 CAPSULE, EXTENDED RELEASE ORAL at 17:18

## 2021-10-24 RX ADMIN — SODIUM CHLORIDE 1000 MILLILITER(S): 9 INJECTION INTRAMUSCULAR; INTRAVENOUS; SUBCUTANEOUS at 17:15

## 2021-10-24 RX ADMIN — SODIUM CHLORIDE 1000 MILLILITER(S): 9 INJECTION INTRAMUSCULAR; INTRAVENOUS; SUBCUTANEOUS at 16:17

## 2021-10-24 RX ADMIN — Medication 15 MILLIGRAM(S): at 16:18

## 2021-10-24 NOTE — ED ADULT NURSE NOTE - OBJECTIVE STATEMENT
62 YO female c/o abdominal pain. Patient reports that pain has been intermittent for the past 2 months, over the past few days pain has been getting more severe. Patient reports that pain is primarily periumbilical, non-radiating, has been using OTC medications for pain with no relief. Patient A&Ox4, abdomen soft, non-distended, periumbilical tenderness noted upon palpations. denies chest pain, SOB, HA, N/V/D, fever/chills, urinary symptoms, hematuria, weakness, dizziness, numbness, tinging. Peripheral pulses present b/l. Skin warm, dry and pink. Pt placed in position of comfort. Pt educated on call bell system and provided call bell. Bed in lowest position, wheels locked, appropriate side rails raised. Pt denies needs at this time.

## 2021-10-24 NOTE — ED PROVIDER NOTE - CLINICAL SUMMARY MEDICAL DECISION MAKING FREE TEXT BOX
60 y/o F w/ dm2, hld, asthma htn presents with x2 months abd pain, intermittent, worsening in past few days. mild periumbilical ttp, obtain labwork, CT, symptomatic support and fluids, dispo pending.

## 2021-10-24 NOTE — ED PROVIDER NOTE - ATTENDING CONTRIBUTION TO CARE
Attending MD Henriquez: I personally have seen and examined this patient.  Resident note reviewed and agree on plan of care and except where noted.  See below for details.     Seen in [facility]  Allergy: Denies    61F with PMH/PSH including DM2 on insulin, HTN, HLD, asthma (s/p history of MICU, intubation distant), HUDSON (supposed to use CPAP) presents to the ED with two months of abdominal pain, now worsening.  Reports that she has been having intermittent umbilical/periumbilical for two months but since yesterday has had increase in pain.  Reports has taken Tylenol, Ibuprofen and Tylenol #3 without improvement.  Denies EtOH, previous pancreatitis, denies previous abdominal surgery.  Denies vaginal bleeding or discharge.  Denies vomiting, diarrhea, blood in stools.  Denies urinary complaints.  Denies tobacco, drugs.  A ten (10) point review of systems was negative other than as stated in the HPI or elsewhere in the chart.     Exam:   General: NAD  HENT: head NCAT, airway patent   Eyes: no conjunctival injection, anicteric  Lungs: lungs CTAB with good inspiratory effort, no wheezing, no rhonchi, no rales  Cardiac: +S1S2, no m/r/g  GI: abdomen soft with +BS, +tenderness to palpation in umbilicus and isaiah umbilical area, exam limited secondary to body habitus, small ecchymosis to R of umbilicus reports from insulin injection, no erythema/induration/fluctuance  : no CVAT  MSK: FROM at neck, no tenderness to midline palpation, no stepoffs along length of spine, no calf tenderness, swelling, erythema or warmth  Neuro: moving all extremities spontaneously, sensory grossly intact, no gross neuro deficits  Psych: normal mood and affect     A/P: 61F with abdominal pain,     TO BE COMPLETED Attending MD Henriquez: I personally have seen and examined this patient.  Resident note reviewed and agree on plan of care and except where noted.  See below for details.     Seen in [facility]  Allergy: Denies    61F with PMH/PSH including DM2 on insulin, HTN, HLD, asthma (s/p history of MICU, intubation distant), HUDSON (supposed to use CPAP) presents to the ED with two months of abdominal pain, now worsening.  Reports that she has been having intermittent umbilical/periumbilical for two months but since yesterday has had increase in pain.  Reports has taken Tylenol, Ibuprofen and Tylenol #3 without improvement.  Denies EtOH, previous pancreatitis, denies previous abdominal surgery.  Denies vaginal bleeding or discharge.  Denies vomiting, diarrhea, blood in stools.  Denies urinary complaints.  Denies tobacco, drugs.  A ten (10) point review of systems was negative other than as stated in the HPI or elsewhere in the chart.     Exam:   General: NAD  HENT: head NCAT, airway patent   Eyes: no conjunctival injection, anicteric  Lungs: lungs CTAB with good inspiratory effort, no wheezing, no rhonchi, no rales  Cardiac: +S1S2, no m/r/g  GI: abdomen soft with +BS, +tenderness to palpation in umbilicus and isaiah umbilical area, no overlying skin changes at umbilicus, exam limited secondary to body habitus, small ecchymosis to R of umbilicus reports from insulin injection, no erythema/induration/fluctuance  : no CVAT  MSK: FROM at neck, no tenderness to midline palpation, no stepoffs along length of spine, no calf tenderness, swelling, erythema or warmth  Neuro: moving all extremities spontaneously, sensory grossly intact, no gross neuro deficits  Psych: normal mood and affect     A/P: 61F with abdominal pain,     TO BE COMPLETED Attending MD Henriquez: I personally have seen and examined this patient.  Resident note reviewed and agree on plan of care and except where noted.  See below for details.     Seen in [facility]  Allergy: Denies    61F with PMH/PSH including DM2 on insulin, HTN, HLD, asthma (s/p history of MICU, intubation distant), HUDSON (supposed to use CPAP) presents to the ED with two months of abdominal pain, now markedly worsening.  Reports that she has been having intermittent umbilical/periumbilical for two months but since yesterday has had increase in pain, reports pain is severe non radiating, no alleviating factors, denies worsening with po intake, reports associated bloating.  Reports has taken Tylenol, Ibuprofen and Tylenol #3 without improvement.  Denies EtOH, previous pancreatitis, denies previous abdominal surgery.  Denies vaginal bleeding or discharge.  Denies vomiting, diarrhea, blood in stools.  Denies urinary complaints.  Denies tobacco, drugs.  Reports has had colonoscopy, last 4-6 months ago, reports normal.  A ten (10) point review of systems was negative other than as stated in the HPI or elsewhere in the chart.     Exam:   General: NAD  HENT: head NCAT, airway patent   Eyes: no conjunctival injection, anicteric  Lungs: lungs CTAB with good inspiratory effort, no wheezing, no rhonchi, no rales  Cardiac: +S1S2, no m/r/g  GI: abdomen soft with +BS, +minimal tenderness to palpation in umbilicus and isaiah umbilical area, no overlying skin changes at umbilicus, exam limited secondary to body habitus, small ecchymosis to R of umbilicus reports from insulin injection, no erythema/induration/fluctuance  : no CVAT  MSK: FROM at neck, no tenderness to midline palpation, no stepoffs along length of spine, no calf tenderness, swelling, erythema or warmth  Neuro: moving all extremities spontaneously, sensory grossly intact, no gross neuro deficits  Psych: normal mood and affect     A/P: 61F with abdominal pain, reports pain severe, abdominal exam - reported pain out of proportion to exam, will obtain CTA to rule out mesenteric ischemia, other DDx includes umbilical hernia, constipation/gas, will obtain labs, keep npo, give IVFs, pain control, reassess

## 2021-10-24 NOTE — ED PROVIDER NOTE - PATIENT PORTAL LINK FT
You can access the FollowMyHealth Patient Portal offered by [facility] by registering at the following website: [email or web address]. By joining [facility]’s FollowMyHealth portal, you will also be able to view your health information using other applications (apps) compatible with our system.

## 2021-10-24 NOTE — ED PROVIDER NOTE - NSFOLLOWUPINSTRUCTIONS_ED_ALL_ED_FT
YOU WERE SEEN IN THE ED FOR: abdominal pain    WHILE YOU WERE HERE, YOU HAD: lab work, CTA Abdomen and pelvis and urinalysis.  Your tests were nonactionable and reassuring.    FOR PAIN, YOU MAY TAKE TYLENOL (Acetaminophen) AND/OR IBUPROFEN (Advil or Motrin). FOLLOW THE INSTRUCTIONS ON THE LABEL/CONTAINER.    PLEASE FOLLOW UP WITH YOUR PRIVATE PHYSICIAN and GI WITHIN THE NEXT 48 HOURS. BRING COPIES OF YOUR RESULTS.    RETURN TO THE EMERGENCY DEPARTMENT IF YOU EXPERIENCE ANY NEW/CONCERNING/WORSENING SYMPTOMS SUCH AS BUT NOT LIMITED TO: worsening abdominal pain, blood in stools, bloody vomiting, inability to move your bowels, or any new or worsening symptom or concern.

## 2021-10-24 NOTE — ED PROVIDER NOTE - OBJECTIVE STATEMENT
60 y/o F w/ hx dm2, hld, asthma, htn presents with x2 months abdominal pain, intermittent, periumbilical sometimes radiating to back, not associated with food, no known hx of pancreatitis. denies steroid use, trauma, gallstones or etoh abuse. denies cp pressure palpitations or urinary frequency/urgency/dysuria.     meds: htn meds, insulin, zoloft  allergies: denies  surg: denies  social: denies smoking drinking or illicit drugs

## 2021-10-24 NOTE — ED PROVIDER NOTE - PHYSICAL EXAMINATION
[Const] well-appearing, resting comfortably, no acute distress  [HEENT] PERRL, EOMI, moist mucus membranes  [Neck] Supple, trachea midline  [CV] +S1/S2, no m/r/g appreciated  [Lungs] Clear to auscultations bilaterally, no adventitious lung sounds  [Abd] mild periumbilical ttp without rebound/guarding or peritoneal signs  [MSK] 5/5 upper extremity and lower extremity str bilaterally  [Skin] warm, dry, well-perfused  [Neuro] A&Ox3

## 2021-10-24 NOTE — ED ADULT NURSE NOTE - DISCHARGE DATE/TIME
Aware   Will see how the patient does over the next week as can take up to 2 weeks for her to notice full effect of injection 
I did try to aspirate the cyst during the patient's procedure  I was unable to get any fluid out of the cyst and I did make the patient aware of that  I did inject the joint with steroid as well to see if I could reduce the cyst that way  The patient has been told it can take up to 2 weeks for her to notice the effect of the injection as it can take that long for the steroids to take full effect and she needs to be patient to see how much relief she gets from the injection  If she does not get relief from the injection I will refer her to Neurosurgery to have it surgically removed 
Patient called again asking if the cyst would be aspirated  She is very angry and yelling that she will not sit around and wait 
Pt reports no improvement post inj   Pain level still goes up to 10/10     She wants to know if you think you would be able to aspirate the cyst or if you could recommend someone 
SW patient, advised of same  Patient verbalizes understanding and appreciative of call back  Patient states she will wait one more week to see if the steroid helps to reduce the cyst   At this time, patient states the her pain is back 100% with no relief  Offered emotional support 
What do you think JW? Please advise   Thanks
24-Oct-2021 21:40

## 2021-10-24 NOTE — ED ADULT TRIAGE NOTE - MODE OF ARRIVAL
Walk in HPI:      Patient is a 62y old  Male who presents with a chief complaint of "I am having pain to my right hip." (25 Jul 2017 07:06)  now s/p right thr today 7-25-17.    Consulted by Dr. Husam Olmedo for VTE prophylaxis, risk stratification, and anticoagulation management.    PAST MEDICAL & SURGICAL HISTORY:  Nasal polyps: history of  Seasonal allergic rhinitis due to pollen  Uncomplicated asthma, unspecified asthma severity  Lumbar herniated disc  Osteoarthritis of right hip, unspecified osteoarthritis type  History of kidney stones  Diverticulosis of intestine without bleeding, unspecified intestinal tract location  MVP (mitral valve prolapse)  Essential hypertension  Tinnitus of both ears  H/O sinus surgery: x 3  H/O arthroscopy of shoulder: left 2/ 2016  H/O lithotripsy: 2015  H/O hernia repair: 11/3/2016    Caprini VTE Risk Score: 6    IMPROVE Bleeding Risk Score:2.5    Falls Risk:   High (  )  Mod (x  )  Low (  )    EBL: 200  ml  crcl: 119.8  7-25-17 pt seen at bedside in PACU.  Discussed hie anticoagulation with ASA  325mg po x 4 weeks  risks and benefits discussed will reinforce if needed    Vital Signs Last 24 Hrs  T(C): 36.2 (25 Jul 2017 12:10), Max: 36.6 (25 Jul 2017 07:00)  T(F): 97.1 (25 Jul 2017 12:10), Max: 97.8 (25 Jul 2017 07:00)  HR: 46 (25 Jul 2017 12:10) (44 - 49)  BP: 115/83 (25 Jul 2017 12:10) (101/61 - 144/95)  BP(mean): --  RR: 16 (25 Jul 2017 12:10) (12 - 16)  SpO2: 98% (25 Jul 2017 12:10) (98% - 100%)  FAMILY HISTORY:  Family history of pancreatic cancer (Mother)  Family history of multiple myeloma (Father)    Denies any personal or familial history of clotting or bleeding disorders.    Allergies    No Known Allergies    Intolerances        REVIEW OF SYSTEMS    (  )Fever	     (  )Constipation	(  )SOB				(  )Headache	(  )Dysuria  (  )Chills	     (  )Melena	(  )Dyspnea present on exertion	                    (  )Dizziness                    (  )Polyuria  (  )Nausea	     (  )Hematochezia	(  )Cough			                    (  )Syncope   	(  )Hematuria  (  )Vomiting    (  )Chest Pain	(  )Wheezing			(  )Weakness  (  )Diarrhea     (  )Palpitations	(  )Anorexia			(  )Myalgia    All other review of systems negative: Yes      PHYSICAL EXAM:    Constitutional: Appears Well    Neurological: A& O x 3    Skin: Warm    Respiratory and Thorax: normal effort; Breath sounds: normal; No rales/wheezing/rhonchi  	  Cardiovascular: S1, S2, regular, NMBR	    Gastrointestinal: BS + x 4Q, nontender	    Genitourinary:  Bladder nondistended, nontender    Musculoskeletal:   General Right:   no muscle/joint tenderness,   normal tone, no joint swelling,   ROM: limitedl	    General Left:   no muscle/joint tenderness,   normal tone, no joint swelling,   ROM: full    Hip:  Right: Dressing CDI; Drain: hemovac ; Type of drng.: serosang.; Amt. of drng: small             Lower extrems:   Right: no calf tenderness              negative anita's sign               + pedal pulses    Left:   no calf tenderness              negative anita's sign               + pedal pulses                          11.8   4.2   )-----------( 204      ( 25 Jul 2017 09:52 )             36.1       07-25    144  |  111<H>  |  13  ----------------------------<  98  3.6   |  24  |  0.68    Ca    8.2<L>      25 Jul 2017 09:52        PT/INR - ( 25 Jul 2017 07:17 )   PT: 10.5 sec;   INR: 0.97 ratio         PTT - ( 25 Jul 2017 07:17 )  PTT:33.8 sec				    MEDICATIONS  (STANDING):  sodium chloride 0.9% lock flush 3 milliLiter(s) IV Push every 8 hours  famotidine    Tablet 20 milliGRAM(s) Oral once  acetaminophen   Tablet 650 milliGRAM(s) Oral every 6 hours  oxyCODONE  ER Tablet 10 milliGRAM(s) Oral every 12 hours  celecoxib 200 milliGRAM(s) Oral with breakfast  valsartan 40 milliGRAM(s) Oral daily  montelukast 10 milliGRAM(s) Oral daily  hydrochlorothiazide 12.5 milliGRAM(s) Oral daily  lactated ringers. 1000 milliLiter(s) IV Continuous <Continuous>  ceFAZolin   IVPB 2000 milliGRAM(s) IV Intermittent every 6 hours  polyethylene glycol 3350 17 Gram(s) Oral daily  docusate sodium 100 milliGRAM(s) Oral three times a day  ferrous    sulfate 325 milliGRAM(s) Oral three times a day with meals  folic acid 1 milliGRAM(s) Oral daily  multivitamin 1 Tablet(s) Oral daily  ascorbic acid 500 milliGRAM(s) Oral two times a day  ropivacaine 0.2% in 0.9% Sodium Chloride PCRA 200 milliLiter(s) Regional Catheter PCA Continuous          DVT Prophylaxis:  LMWH                   (  )  Heparin SQ           (  )  Coumadin             (  )  Xarelto                  (  )  Eliquis                   (  )  Venodynes           (x  )  Ambulation          (x  )  UFH                       (  )  Contraindicated  (  )  asa                   (x) [employer]

## 2021-10-24 NOTE — ED PROVIDER NOTE - PROGRESS NOTE DETAILS
Attending MD Henriquez: Patient re-evaluated and feeling improved.  No acute issues at  this time.  Lab and radiology tests reviewed with patient and/or family.  Patient stable for discharge. Follow up instructions given, importance of follow up emphasized, return to ED parameters reviewed and patient verbalized understanding.  All questions answered, all concerns addressed.

## 2021-10-25 LAB
CULTURE RESULTS: SIGNIFICANT CHANGE UP
SPECIMEN SOURCE: SIGNIFICANT CHANGE UP

## 2021-10-26 LAB
-  COAGULASE NEGATIVE STAPHYLOCOCCUS: SIGNIFICANT CHANGE UP
GRAM STN FLD: SIGNIFICANT CHANGE UP
METHOD TYPE: SIGNIFICANT CHANGE UP

## 2021-10-26 NOTE — ED POST DISCHARGE NOTE - DETAILS
10/26: M for pt to return call to ED.  - Della Melendez PA-C 10/27: Spoke to pt and informed of results. She states she never had fevers. Blood cx possible drawn b/c of elevated initial lactate. Pt w/out leukocytosis, UA and CXR not concerning for infection and CTa/p performed w/out acute findings. Pt reports she has not had fevers and is feel much improved. HAs sx follow up this week for further work-up of 2mo abd pain., Advised + blood cx can indicate serious infection. Given coag neg staph and pt asymptomatic likely skin contaminate. ** continued

## 2021-10-26 NOTE — ED POST DISCHARGE NOTE - ADDITIONAL DOCUMENTATION
** contonued: Advised to discuss result at follow up. Pt aware to return to ED for any change in symptoms including fevers, weakness, chills, lethargy, increased pain, onset vomiting/diarrhea and all other concerns as change of symptoms may indicate serious infection that would require admission and iv abx. Pt endorsed understanding of plan  and agrees to return to ED if symptoms change and will follow up as scheduled in 2 days. - Della Melendez PA-C

## 2021-10-27 LAB
CULTURE RESULTS: SIGNIFICANT CHANGE UP
ORGANISM # SPEC MICROSCOPIC CNT: SIGNIFICANT CHANGE UP
ORGANISM # SPEC MICROSCOPIC CNT: SIGNIFICANT CHANGE UP
SPECIMEN SOURCE: SIGNIFICANT CHANGE UP

## 2021-10-29 ENCOUNTER — APPOINTMENT (OUTPATIENT)
Dept: SURGERY | Facility: CLINIC | Age: 61
End: 2021-10-29

## 2021-10-29 LAB
CULTURE RESULTS: SIGNIFICANT CHANGE UP
SPECIMEN SOURCE: SIGNIFICANT CHANGE UP

## 2021-11-04 NOTE — HISTORY OF PRESENT ILLNESS
[de-identified] : SHEREE  is a 61 year  female  here for a consultation for possible abdominal hernia.

## 2021-11-05 ENCOUNTER — APPOINTMENT (OUTPATIENT)
Dept: SURGERY | Facility: CLINIC | Age: 61
End: 2021-11-05

## 2021-11-05 ENCOUNTER — APPOINTMENT (OUTPATIENT)
Dept: PULMONOLOGY | Facility: CLINIC | Age: 61
End: 2021-11-05

## 2021-11-22 ENCOUNTER — APPOINTMENT (OUTPATIENT)
Dept: SURGERY | Facility: CLINIC | Age: 61
End: 2021-11-22
Payer: MEDICAID

## 2021-11-22 VITALS
HEIGHT: 61 IN | HEART RATE: 91 BPM | DIASTOLIC BLOOD PRESSURE: 83 MMHG | OXYGEN SATURATION: 98 % | WEIGHT: 183 LBS | BODY MASS INDEX: 34.55 KG/M2 | TEMPERATURE: 97.8 F | RESPIRATION RATE: 17 BRPM | SYSTOLIC BLOOD PRESSURE: 127 MMHG

## 2021-11-22 PROCEDURE — 99204 OFFICE O/P NEW MOD 45 MIN: CPT

## 2021-11-22 RX ORDER — IPRATROPIUM BROMIDE AND ALBUTEROL SULFATE 2.5; .5 MG/3ML; MG/3ML
0.5-2.5 (3) SOLUTION RESPIRATORY (INHALATION)
Qty: 120 | Refills: 3 | Status: DISCONTINUED | COMMUNITY
Start: 2019-04-23 | End: 2021-11-22

## 2021-11-22 RX ORDER — HYDROCODONE BITARTRATE AND HOMATROPINE METHYLBROMIDE 5; 1.5 MG/5ML; MG/5ML
5-1.5 SYRUP ORAL
Qty: 240 | Refills: 0 | Status: DISCONTINUED | COMMUNITY
Start: 2020-04-09 | End: 2021-11-22

## 2021-11-22 RX ORDER — BUDESONIDE AND FORMOTEROL FUMARATE DIHYDRATE 160; 4.5 UG/1; UG/1
160-4.5 AEROSOL RESPIRATORY (INHALATION) TWICE DAILY
Qty: 10.2 | Refills: 3 | Status: DISCONTINUED | COMMUNITY
Start: 2021-11-08 | End: 2021-11-22

## 2021-11-22 RX ORDER — AZELASTINE HYDROCHLORIDE 137 UG/1
137 SPRAY, METERED NASAL TWICE DAILY
Qty: 1 | Refills: 1 | Status: DISCONTINUED | COMMUNITY
Start: 2019-11-07 | End: 2021-11-22

## 2021-11-22 RX ORDER — IPRATROPIUM BROMIDE AND ALBUTEROL SULFATE 2.5; .5 MG/3ML; MG/3ML
0.5-2.5 (3) SOLUTION RESPIRATORY (INHALATION)
Qty: 1 | Refills: 1 | Status: DISCONTINUED | COMMUNITY
Start: 2018-08-10 | End: 2021-11-22

## 2021-11-22 RX ORDER — ALBUTEROL SULFATE 2.5 MG/3ML
(2.5 MG/3ML) SOLUTION RESPIRATORY (INHALATION)
Qty: 1 | Refills: 2 | Status: DISCONTINUED | COMMUNITY
Start: 2018-06-01 | End: 2021-11-22

## 2021-11-22 RX ORDER — PREDNISONE 20 MG/1
20 TABLET ORAL DAILY
Qty: 14 | Refills: 1 | Status: DISCONTINUED | COMMUNITY
Start: 2021-04-27 | End: 2021-11-22

## 2021-11-22 RX ORDER — BENZONATATE 200 MG/1
200 CAPSULE ORAL 3 TIMES DAILY
Qty: 60 | Refills: 0 | Status: DISCONTINUED | COMMUNITY
Start: 2020-04-09 | End: 2021-11-22

## 2021-11-22 RX ORDER — ALBUTEROL SULFATE 90 UG/1
108 (90 BASE) AEROSOL, METERED RESPIRATORY (INHALATION)
Qty: 1 | Refills: 2 | Status: DISCONTINUED | COMMUNITY
Start: 2019-02-06 | End: 2021-11-22

## 2021-11-22 RX ORDER — AZELASTINE HYDROCHLORIDE 137 UG/1
0.1 SPRAY, METERED NASAL
Qty: 1 | Refills: 3 | Status: DISCONTINUED | COMMUNITY
Start: 2020-03-22 | End: 2021-11-22

## 2021-11-22 RX ORDER — PREDNISONE 10 MG/1
10 TABLET ORAL
Qty: 45 | Refills: 0 | Status: DISCONTINUED | COMMUNITY
Start: 2020-04-09 | End: 2021-11-22

## 2021-11-22 RX ORDER — FLUTICASONE PROPIONATE AND SALMETEROL 250; 50 UG/1; UG/1
250-50 POWDER RESPIRATORY (INHALATION)
Qty: 1 | Refills: 3 | Status: DISCONTINUED | COMMUNITY
Start: 2019-02-06 | End: 2021-11-22

## 2021-11-22 NOTE — PHYSICAL EXAM
[Normal Thyroid] : the thyroid was normal [Normal Breath Sounds] : Normal breath sounds [Normal Heart Sounds] : normal heart sounds [Normal Rate and Rhythm] : normal rate and rhythm [No Rash or Lesion] : No rash or lesion [Alert] : alert [Oriented to Person] : oriented to person [Oriented to Place] : oriented to place [Oriented to Time] : oriented to time [Calm] : calm [JVD] : no jugular venous distention  [Carotid Bruits] : no carotid bruits [Abdominal Masses] : No abdominal masses [Abdomen Tenderness] : ~T ~M No abdominal tenderness [de-identified] : WELL NOURISHED ,NOT IN DISTRESS  [de-identified] : ANICTERIC [de-identified] : NO LN [de-identified] : DEFERRED  [de-identified] : small umbilical hernia is reducible and nontender [de-identified] : WNL [de-identified] : DEFERRED  [de-identified] : GOOD POWER AND TONE

## 2021-11-22 NOTE — PLAN
[FreeTextEntry1] : Will schedule repair of umbilical hernia with mesh.\par Requesting pre-op medical and cardiology clearance.

## 2021-11-22 NOTE — REVIEW OF SYSTEMS
[Abdominal Pain] : abdominal pain [Negative] : Heme/Lymph [Vomiting] : no vomiting [Constipation] : no constipation [Diarrhea] : no diarrhea [Heartburn] : no heartburn [Melena] : no melena [FreeTextEntry7] : feels a bulge in the mid line

## 2021-11-22 NOTE — ASSESSMENT
[FreeTextEntry1] : 61Y F wit a BMI of 35,DM, HLD,HTN and a reducible umbilical hernia that has recently become symptomatic\par and is seeking repair. \par All questions answered\par Risks and benefits of umbilical hernia repair with mesh explained -- including bleeding, infection, recurrence.\par She has full comprehension of the plan and diagnoses.\par Romanian telephone  was used for the visit.

## 2021-11-22 NOTE — HISTORY OF PRESENT ILLNESS
[de-identified] : SHEREE  is a 61 year  female here for a consultation for possible abdominal hernia. \par She says that she noticed it 2 years ago, but is now interfering with her ADLs\par It gets painful occasionally and now such episodes are more frequent.\par She can feel the bulge in the mid line above the umbilicus\par No N/V\par No constipation\par No weight Loss\par No Reflux\par \par PMHx: DM,HTN,HLD and severe obesity with a BMI @ 35.\par No Snoring\par Had normal colonoscopy last winter. [de-identified] : She had a CT x 2 at 2 hospitals for the similar presentation\par CTs reviewed today\par She is made aware of the BMI/OverWeight and educated regarding the same. \par

## 2021-12-08 ENCOUNTER — APPOINTMENT (OUTPATIENT)
Dept: SURGERY | Facility: CLINIC | Age: 61
End: 2021-12-08
Payer: MEDICAID

## 2021-12-08 VITALS
BODY MASS INDEX: 33.99 KG/M2 | HEIGHT: 61 IN | OXYGEN SATURATION: 98 % | SYSTOLIC BLOOD PRESSURE: 139 MMHG | HEART RATE: 95 BPM | WEIGHT: 180 LBS | DIASTOLIC BLOOD PRESSURE: 84 MMHG | RESPIRATION RATE: 18 BRPM | TEMPERATURE: 98.1 F

## 2021-12-08 DIAGNOSIS — K42.9 UMBILICAL HERNIA W/OUT OBSTRUCTION OR GANGRENE: ICD-10-CM

## 2021-12-08 PROCEDURE — 99214 OFFICE O/P EST MOD 30 MIN: CPT

## 2021-12-08 RX ORDER — LEVOFLOXACIN 500 MG/1
500 TABLET, FILM COATED ORAL DAILY
Qty: 7 | Refills: 0 | Status: DISCONTINUED | COMMUNITY
Start: 2021-04-27 | End: 2021-12-08

## 2021-12-08 RX ORDER — ZOLPIDEM TARTRATE 5 MG/1
5 TABLET ORAL
Qty: 15 | Refills: 0 | Status: DISCONTINUED | COMMUNITY
Start: 2017-12-05 | End: 2021-12-08

## 2021-12-08 RX ORDER — DOXYCYCLINE HYCLATE 100 MG/1
100 TABLET ORAL TWICE DAILY
Qty: 20 | Refills: 0 | Status: DISCONTINUED | COMMUNITY
Start: 2020-04-09 | End: 2021-12-08

## 2021-12-20 NOTE — ED ADULT NURSE NOTE - NS ED NURSE LEVEL OF CONSCIOUSNESS MENTAL STATUS
[General Appearance - Well Developed] : well developed [Normal Appearance] : normal appearance [General Appearance - Well Nourished] : well nourished [General Appearance - In No Acute Distress] : no acute distress [Normal Conjunctiva] : the conjunctiva exhibited no abnormalities [Normal Oropharynx] : normal oropharynx [Normal Jugular Venous V Waves Present] : normal jugular venous V waves present [Exaggerated Use Of Accessory Muscles For Inspiration] : no accessory muscle use [Heart Rate And Rhythm] : heart rate and rhythm were normal [Heart Sounds] : normal S1 and S2 [Arterial Pulses Normal] : the arterial pulses were normal [Edema] : no peripheral edema present [Abdomen Soft] : soft [Abdomen Tenderness] : non-tender [Abnormal Walk] : normal gait [Skin Turgor] : normal skin turgor [] : no rash [Oriented To Time, Place, And Person] : oriented to person, place, and time [Impaired Insight] : insight and judgment were intact [Affect] : the affect was normal [FreeTextEntry1] : Grade I/VI systolic murmur [No Venous Stasis] : no venous stasis [No Skin Ulcers] : no skin ulcer Awake

## 2021-12-27 PROBLEM — K42.9 UMBILICAL HERNIA: Status: ACTIVE | Noted: 2021-12-27

## 2021-12-27 NOTE — HISTORY OF PRESENT ILLNESS
[de-identified] : This is a 61 year female here for a consultation for an umbilical hernia. She reports umbilical pain and lump present for two years, interferes more with her daily activities for the past two months. \par She denies fever, nausea, vomiting, fever or chills. Denies constipation. Reports having normal daily bowel habits.  CT scan from 10/06/2021, demonstrated mild fatty infiltration of the liver, small umbilical hernia containing fat. \par Here for 2nd opinion. She saw Dr Leos who recommended repair.

## 2021-12-27 NOTE — REASON FOR VISIT
[Consultation] : a consultation visit [Other: _____] : [unfilled] [Source: ________] : History obtained from [unfilled] [FreeTextEntry1] : Umbilical hernia - 2nd opinion

## 2021-12-27 NOTE — ASSESSMENT
[FreeTextEntry1] : 61 year-old female with an umbilical hernia. I recommended open umbilical hernia repair and discussed the details, risks, benefits, and alternatives of the procedure and expectations of recovery with the patient and the risks and benefits of mesh placement. \par The pt will call my office if she wishes to proceed. \par \par \par \par \par

## 2022-02-02 ENCOUNTER — RESULT REVIEW (OUTPATIENT)
Age: 62
End: 2022-02-02

## 2022-02-02 ENCOUNTER — APPOINTMENT (OUTPATIENT)
Dept: MAMMOGRAPHY | Facility: CLINIC | Age: 62
End: 2022-02-02
Payer: MEDICAID

## 2022-02-02 PROCEDURE — 77063 BREAST TOMOSYNTHESIS BI: CPT

## 2022-02-02 PROCEDURE — 77067 SCR MAMMO BI INCL CAD: CPT | Mod: 59

## 2022-02-07 ENCOUNTER — RESULT REVIEW (OUTPATIENT)
Age: 62
End: 2022-02-07

## 2022-02-07 ENCOUNTER — OUTPATIENT (OUTPATIENT)
Dept: OUTPATIENT SERVICES | Facility: HOSPITAL | Age: 62
LOS: 1 days | End: 2022-02-07
Payer: MEDICAID

## 2022-02-07 ENCOUNTER — TRANSCRIPTION ENCOUNTER (OUTPATIENT)
Age: 62
End: 2022-02-07

## 2022-02-07 DIAGNOSIS — K62.5 HEMORRHAGE OF ANUS AND RECTUM: ICD-10-CM

## 2022-02-07 LAB
GLUCOSE BLDC GLUCOMTR-MCNC: 150 MG/DL — HIGH (ref 70–99)
GLUCOSE BLDC GLUCOMTR-MCNC: 58 MG/DL — LOW (ref 70–99)
GLUCOSE BLDC GLUCOMTR-MCNC: 69 MG/DL — LOW (ref 70–99)

## 2022-02-07 PROCEDURE — 88312 SPECIAL STAINS GROUP 1: CPT

## 2022-02-07 PROCEDURE — 45380 COLONOSCOPY AND BIOPSY: CPT

## 2022-02-07 PROCEDURE — 88305 TISSUE EXAM BY PATHOLOGIST: CPT

## 2022-02-07 PROCEDURE — 43239 EGD BIOPSY SINGLE/MULTIPLE: CPT

## 2022-02-07 PROCEDURE — 88305 TISSUE EXAM BY PATHOLOGIST: CPT | Mod: 26

## 2022-02-07 PROCEDURE — 82962 GLUCOSE BLOOD TEST: CPT

## 2022-02-07 PROCEDURE — 88312 SPECIAL STAINS GROUP 1: CPT | Mod: 26

## 2022-02-09 LAB — SURGICAL PATHOLOGY STUDY: SIGNIFICANT CHANGE UP

## 2022-04-06 NOTE — ED PROVIDER NOTE - DATE/TIME 1
Spoke to patient regarding post op care. Patient states denies any complaints. Wound site dry and intact without redness, swelling, hematoma or drainage. Patient denies any fever or pain.   Patient  has resumed medications and/or picked up new prescriptions ordered at discharge.Patient verbalizes understanding of all post op instructions.    Patient reports by post op day 3 she starting having return of aflutter but it was intermittent with HR no higher than 100.   She is taking her carvedilol to help with this.  Denies prolonged flutter and knows that this is common during the healing period post ablation.  Patient knows when to seek emergency medical attention.              
24-Oct-2021 21:17

## 2023-04-28 NOTE — DISCHARGE NOTE PROVIDER - NSDCCAREPROVSEEN_GEN_ALL_CORE_FT
[FreeTextEntry3] : I, Dr. Bradford personally performed the evaluation and management (E/M) services including all necessary procedures, for this new patient. That E/M includes conducting the clinically appropriate initial history &/or exam, assessing all conditions, and establishing the plan of care. Today, my BEBA, Aurora Ravi, was here to observe &/or participate in the visit & follow plan of care established by me.\par \par \par 
Timothy Leon

## 2023-06-12 NOTE — HISTORY OF PRESENT ILLNESS
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  Patient recently hospitalized for Influenza and and acute exacerbation asthma.  She reports she is feeling better.  She is scheduled for Nucala injection today.  She reports no increased respiratory symptoms.  She is here for follow up and first Nucala injection. Resulted
